# Patient Record
Sex: FEMALE | Race: WHITE | NOT HISPANIC OR LATINO | Employment: OTHER | URBAN - METROPOLITAN AREA
[De-identification: names, ages, dates, MRNs, and addresses within clinical notes are randomized per-mention and may not be internally consistent; named-entity substitution may affect disease eponyms.]

---

## 2018-02-02 ENCOUNTER — TELEPHONE (OUTPATIENT)
Dept: GASTROENTEROLOGY | Facility: CLINIC | Age: 68
End: 2018-02-02

## 2018-02-02 DIAGNOSIS — K58.0 IRRITABLE BOWEL SYNDROME WITH DIARRHEA: Primary | ICD-10-CM

## 2018-02-02 RX ORDER — ALOSETRON HYDROCHLORIDE 0.5 MG/1
1 TABLET, FILM COATED ORAL DAILY
COMMUNITY
Start: 2016-06-21 | End: 2018-02-02 | Stop reason: SDUPTHER

## 2018-02-02 RX ORDER — ALOSETRON HYDROCHLORIDE 0.5 MG/1
0.5 TABLET, FILM COATED ORAL DAILY
Qty: 90 TABLET | Refills: 3 | Status: SHIPPED | OUTPATIENT
Start: 2018-02-02 | End: 2018-05-17 | Stop reason: SDUPTHER

## 2018-05-17 DIAGNOSIS — K58.0 IRRITABLE BOWEL SYNDROME WITH DIARRHEA: ICD-10-CM

## 2018-05-17 RX ORDER — ALOSETRON HYDROCHLORIDE 0.5 MG/1
0.5 TABLET, FILM COATED ORAL DAILY
Qty: 30 TABLET | Refills: 0 | Status: SHIPPED | OUTPATIENT
Start: 2018-05-17 | End: 2018-05-31 | Stop reason: DRUGHIGH

## 2018-05-17 NOTE — TELEPHONE ENCOUNTER
Spoke to Shala Republic T  Preformed a verbal authorization for medication which was approved should be sending over within next 2-3 hours        Patient's Medication Medicare Number  [id number]      Approval number:  [id number]        Pt's Medicare Part D ID Number:  [id number]

## 2018-05-29 RX ORDER — CITALOPRAM 40 MG/1
40 TABLET ORAL DAILY
Refills: 3 | COMMUNITY
Start: 2018-02-23

## 2018-05-29 RX ORDER — EZETIMIBE 10 MG/1
10 TABLET ORAL DAILY
Refills: 3 | COMMUNITY
Start: 2018-03-16

## 2018-05-29 RX ORDER — MELOXICAM 15 MG/1
TABLET ORAL
Refills: 0 | COMMUNITY
Start: 2018-04-03

## 2018-05-29 RX ORDER — AMLODIPINE BESYLATE 5 MG/1
TABLET ORAL
COMMUNITY

## 2018-05-29 RX ORDER — PANTOPRAZOLE SODIUM 40 MG/1
1 TABLET, DELAYED RELEASE ORAL DAILY
COMMUNITY
Start: 2016-07-19 | End: 2018-05-31 | Stop reason: DRUGHIGH

## 2018-05-29 RX ORDER — ROSUVASTATIN CALCIUM 5 MG/1
TABLET, COATED ORAL
COMMUNITY

## 2018-05-29 RX ORDER — HYDROQUINONE 40 MG/G
CREAM TOPICAL
Refills: 0 | COMMUNITY
Start: 2018-02-26

## 2018-05-29 RX ORDER — LEVOTHYROXINE SODIUM 112 UG/1
112 TABLET ORAL DAILY
Refills: 3 | COMMUNITY
Start: 2018-03-16

## 2018-05-31 ENCOUNTER — OFFICE VISIT (OUTPATIENT)
Dept: GASTROENTEROLOGY | Facility: CLINIC | Age: 68
End: 2018-05-31
Payer: MEDICARE

## 2018-05-31 VITALS
SYSTOLIC BLOOD PRESSURE: 152 MMHG | HEART RATE: 73 BPM | BODY MASS INDEX: 38.64 KG/M2 | DIASTOLIC BLOOD PRESSURE: 80 MMHG | WEIGHT: 225.13 LBS

## 2018-05-31 DIAGNOSIS — Z12.11 SCREENING FOR COLON CANCER: Primary | ICD-10-CM

## 2018-05-31 DIAGNOSIS — K58.0 IRRITABLE BOWEL SYNDROME WITH DIARRHEA: ICD-10-CM

## 2018-05-31 DIAGNOSIS — K21.9 GASTROESOPHAGEAL REFLUX DISEASE, ESOPHAGITIS PRESENCE NOT SPECIFIED: ICD-10-CM

## 2018-05-31 PROCEDURE — 99214 OFFICE O/P EST MOD 30 MIN: CPT | Performed by: PHYSICIAN ASSISTANT

## 2018-05-31 RX ORDER — PANTOPRAZOLE SODIUM 40 MG/1
40 TABLET, DELAYED RELEASE ORAL 2 TIMES DAILY
Qty: 60 TABLET | Refills: 1 | Status: SHIPPED | OUTPATIENT
Start: 2018-05-31 | End: 2018-06-16 | Stop reason: SDUPTHER

## 2018-05-31 RX ORDER — ALOSETRON HYDROCHLORIDE 0.5 MG/1
0.5 TABLET, FILM COATED ORAL 2 TIMES DAILY
Qty: 60 TABLET | Refills: 1 | Status: SHIPPED | OUTPATIENT
Start: 2018-05-31 | End: 2018-08-16 | Stop reason: SDUPTHER

## 2018-05-31 RX ORDER — MELATONIN
6000 DAILY
COMMUNITY

## 2018-05-31 NOTE — LETTER
May 31, 2018     Naheed Wright MD  [id number] [facility] [address]    Patient: Juan Lopez   YOB: 1950   Date of Visit: 5/31/2018       Dear Dr Jim Fox:    Thank you for referring Juan Lopez to me for evaluation  Below are my notes for this consultation  If you have questions, please do not hesitate to call me  I look forward to following your patient along with you  Sincerely,        Bianca Vicente PA-C        CC: No Recipients  Vernida Sides  5/31/2018 11:35 AM  Sign at close encounter  [facility] - Outpatient Follow-up Note  Juan Lopez 70 y o  female MRN: [id number]  Encounter: [id number]          ASSESSMENT AND PLAN:      1  Screening for colon cancer  - Last colonoscopy in 2013 without polyps but with significant family history of colon cancer in her father  - Schedule for colonoscopy as screening    2  Gastroesophageal reflux disease, esophagitis presence not specified  - Increase protonix to 40 mg BID  - Schedule for EGD to screen for Martines's, esophagitis, gastritis  - Her anxiety may also be playing a component which she is aware of    3  Irritable bowel syndrome with diarrhea  - Will increase lotronex to 0 5 mg BID to see if this helps her bowel movements further  - Discussed risks of ischemic colitis and watching for symptoms such as severe abdominal pain or BRBPR; she is aware and understands      Follow up pending EGD/colonoscopy results  ______________________________________________________________________    SUBJECTIVEEvelena Apley is a pleasant 69 yo F with a PMH of HTN, IBS diarrhea on lotronex, depression/anxiety, hypothyroidism, HLD, GERD, presenting for follow up of her chronic GI issues including need for colon cancer screening  Her last colonoscopy was in 2013 without polyps but she does have a family history of colon cancer in her father so she is due this year for recall    Her bowel movements are worse due to anxiety as she is under a lot of stress traveling back and forth to [place] helping her mother who is very demented  She has urgency in the morning and doesn't plan for anything in the AM due to her bowel habits  The lotronex does help but not as much as it used to  She also had chronic GERD but has never had an EGD  She takes protonix daily  She is having increased upper GI gas but is unable to belch  She denies nausea or vomiting  She denies dysphagia  Her brother has Martines's so she feels like she needs to have an EGD as well  She denies unintentional weight loss, melena, or hematochezia  REVIEW OF SYSTEMS IS OTHERWISE NEGATIVE        Historical Information   Past Medical History:   Diagnosis Date    Asthma     Hyperlipidemia     Hypertension     Hypothyroidism      Past Surgical History:   Procedure Laterality Date    BLADDER SURGERY      LIFT    BREAST SURGERY Left     CHOLECYSTECTOMY       Social History   History   Alcohol Use    Yes     Comment: 1-2 times a year     History   Drug Use No     History   Smoking Status    Former Smoker    Years: 40 00   Smokeless Tobacco    Never Used     Family History   Problem Relation Age of Onset    Breast cancer Mother     Dementia Mother     Colon cancer Father     Heart disease Brother        Meds/Allergies       Current Outpatient Prescriptions:     amLODIPine (NORVASC) 5 mg tablet    aspirin 81 MG tablet    cholecalciferol (VITAMIN D3) 1,000 units tablet    citalopram (CeleXA) 40 mg tablet    ezetimibe (ZETIA) 10 mg tablet    hydroquinone 4 % cream    levothyroxine 112 mcg tablet    Probiotic Product ([employer] PROBIOTIC PO)    RESTASIS MULTIDOSE 0 05 % ophthalmic emulsion    rosuvastatin (CRESTOR) 5 mg tablet    alosetron (LOTRONEX) 0 5 MG tablet    meloxicam (MOBIC) 15 mg tablet    pantoprazole (PROTONIX) 40 mg tablet    Allergies   Allergen Reactions    Ciprofloxacin     Morphine Vomiting    Other            Objective Blood pressure 152/80, pulse 73, weight 102 kg (225 lb 2 oz)  Body mass index is 38 64 kg/m²  PHYSICAL EXAM:      General Appearance:   Alert, cooperative, no distress   HEENT:   Normocephalic, atraumatic, anicteric      Neck:  Supple, symmetrical, trachea midline   Lungs:   Clear to auscultation bilaterally; no rales, rhonchi or wheezing; respirations unlabored    Heart[de-identified]   Regular rate and rhythm; no murmur, rub, or gallop  Abdomen:   Soft, non-tender, non-distended; normal bowel sounds; no masses, no organomegaly    Rectal:   Deferred    Extremities:  No cyanosis, clubbing or edema    Pulses:  2+ and symmetric    Skin:  No jaundice, rashes, or lesions      Lab Results:   No visits with results within 1 Day(s) from this visit  Latest known visit with results is:   No results found for any previous visit  Radiology Results:   No results found

## 2018-05-31 NOTE — PROGRESS NOTES
[facility] - Outpatient Follow-up Note  Juan Lopez 70 y o  female MRN: [id number]  Encounter: [id number]          ASSESSMENT AND PLAN:      1  Screening for colon cancer  - Last colonoscopy in 2013 without polyps but with significant family history of colon cancer in her father  - Schedule for colonoscopy as screening    2  Gastroesophageal reflux disease, esophagitis presence not specified  - Increase protonix to 40 mg BID  - Schedule for EGD to screen for Martines's, esophagitis, gastritis  - Her anxiety may also be playing a component which she is aware of    3  Irritable bowel syndrome with diarrhea  - Will increase lotronex to 0 5 mg BID to see if this helps her bowel movements further  - Discussed risks of ischemic colitis and watching for symptoms such as severe abdominal pain or BRBPR; she is aware and understands      Follow up pending EGD/colonoscopy results  ______________________________________________________________________    SUBJECTIVEEvelena Apley is a pleasant 71 yo F with a PMH of HTN, IBS diarrhea on lotronex, depression/anxiety, hypothyroidism, HLD, GERD, presenting for follow up of her chronic GI issues including need for colon cancer screening  Her last colonoscopy was in 2013 without polyps but she does have a family history of colon cancer in her father so she is due this year for recall  Her bowel movements are worse due to anxiety as she is under a lot of stress traveling back and forth to [place] helping her mother who is very demented  She has urgency in the morning and doesn't plan for anything in the AM due to her bowel habits  The lotronex does help but not as much as it used to  She also had chronic GERD but has never had an EGD  She takes protonix daily  She is having increased upper GI gas but is unable to belch  She denies nausea or vomiting  She denies dysphagia  Her brother has Martines's so she feels like she needs to have an EGD as well   She denies unintentional weight loss, melena, or hematochezia  REVIEW OF SYSTEMS IS OTHERWISE NEGATIVE  Historical Information   Past Medical History:   Diagnosis Date    Asthma     Hyperlipidemia     Hypertension     Hypothyroidism      Past Surgical History:   Procedure Laterality Date    BLADDER SURGERY      LIFT    BREAST SURGERY Left     CHOLECYSTECTOMY       Social History   History   Alcohol Use    Yes     Comment: 1-2 times a year     History   Drug Use No     History   Smoking Status    Former Smoker    Years: 40 00   Smokeless Tobacco    Never Used     Family History   Problem Relation Age of Onset    Breast cancer Mother     Dementia Mother     Colon cancer Father     Heart disease Brother        Meds/Allergies       Current Outpatient Prescriptions:     amLODIPine (NORVASC) 5 mg tablet    aspirin 81 MG tablet    cholecalciferol (VITAMIN D3) 1,000 units tablet    citalopram (CeleXA) 40 mg tablet    ezetimibe (ZETIA) 10 mg tablet    hydroquinone 4 % cream    levothyroxine 112 mcg tablet    Probiotic Product ([employer] PROBIOTIC PO)    RESTASIS MULTIDOSE 0 05 % ophthalmic emulsion    rosuvastatin (CRESTOR) 5 mg tablet    alosetron (LOTRONEX) 0 5 MG tablet    meloxicam (MOBIC) 15 mg tablet    pantoprazole (PROTONIX) 40 mg tablet    Allergies   Allergen Reactions    Ciprofloxacin     Morphine Vomiting    Other            Objective     Blood pressure 152/80, pulse 73, weight 102 kg (225 lb 2 oz)  Body mass index is 38 64 kg/m²  PHYSICAL EXAM:      General Appearance:   Alert, cooperative, no distress   HEENT:   Normocephalic, atraumatic, anicteric      Neck:  Supple, symmetrical, trachea midline   Lungs:   Clear to auscultation bilaterally; no rales, rhonchi or wheezing; respirations unlabored    Heart[de-identified]   Regular rate and rhythm; no murmur, rub, or gallop     Abdomen:   Soft, non-tender, non-distended; normal bowel sounds; no masses, no organomegaly    Rectal:   Deferred  Extremities:  No cyanosis, clubbing or edema    Pulses:  2+ and symmetric    Skin:  No jaundice, rashes, or lesions      Lab Results:   No visits with results within 1 Day(s) from this visit  Latest known visit with results is:   No results found for any previous visit  Radiology Results:   No results found

## 2018-06-06 ENCOUNTER — TELEPHONE (OUTPATIENT)
Dept: GASTROENTEROLOGY | Facility: CLINIC | Age: 68
End: 2018-06-06

## 2018-06-06 NOTE — TELEPHONE ENCOUNTER
Submitted PA for Pantoprazole BID  It was approved for a year, June 6, 2019  PA #: [id number]    Called pharmacy and advised  LMOM pt advising it was approved

## 2018-06-16 DIAGNOSIS — K21.9 GASTROESOPHAGEAL REFLUX DISEASE, ESOPHAGITIS PRESENCE NOT SPECIFIED: ICD-10-CM

## 2018-06-18 RX ORDER — PANTOPRAZOLE SODIUM 40 MG/1
TABLET, DELAYED RELEASE ORAL
Qty: 30 TABLET | Refills: 6 | Status: SHIPPED | OUTPATIENT
Start: 2018-06-18 | End: 2018-11-14 | Stop reason: SDUPTHER

## 2018-07-17 ENCOUNTER — TELEPHONE (OUTPATIENT)
Dept: GASTROENTEROLOGY | Facility: CLINIC | Age: 68
End: 2018-07-17

## 2018-08-16 DIAGNOSIS — K58.0 IRRITABLE BOWEL SYNDROME WITH DIARRHEA: ICD-10-CM

## 2018-08-16 RX ORDER — ALOSETRON HYDROCHLORIDE 0.5 MG/1
0.5 TABLET, FILM COATED ORAL 2 TIMES DAILY
Qty: 60 TABLET | Refills: 1 | Status: SHIPPED | OUTPATIENT
Start: 2018-08-16 | End: 2018-11-14 | Stop reason: SDUPTHER

## 2018-11-14 DIAGNOSIS — K21.9 GASTROESOPHAGEAL REFLUX DISEASE, ESOPHAGITIS PRESENCE NOT SPECIFIED: ICD-10-CM

## 2018-11-14 DIAGNOSIS — K58.0 IRRITABLE BOWEL SYNDROME WITH DIARRHEA: ICD-10-CM

## 2018-11-14 RX ORDER — PANTOPRAZOLE SODIUM 40 MG/1
40 TABLET, DELAYED RELEASE ORAL DAILY
Qty: 30 TABLET | Refills: 2 | Status: SHIPPED | OUTPATIENT
Start: 2018-11-14 | End: 2019-08-23 | Stop reason: SDUPTHER

## 2018-11-14 RX ORDER — ALOSETRON HYDROCHLORIDE 0.5 MG/1
0.5 TABLET, FILM COATED ORAL 2 TIMES DAILY
Qty: 60 TABLET | Refills: 2 | Status: SHIPPED | OUTPATIENT
Start: 2018-11-14 | End: 2019-02-27 | Stop reason: SDUPTHER

## 2018-11-14 NOTE — TELEPHONE ENCOUNTER
Spoke to pt, advised we never received anything but that I would send RX for both Pantoprazole and Alosetron  RX sent to [facility] for Pantoprazole and Alosetron, 30 day supplys with refills

## 2018-11-14 NOTE — TELEPHONE ENCOUNTER
Patient called to find out why the refill for lotronex was denied and also would like a refill of pantoprazole   Please call patient [phone] ty

## 2019-01-31 ENCOUNTER — DOCUMENTATION (OUTPATIENT)
Dept: GASTROENTEROLOGY | Facility: CLINIC | Age: 69
End: 2019-01-31

## 2019-02-27 DIAGNOSIS — K58.0 IRRITABLE BOWEL SYNDROME WITH DIARRHEA: ICD-10-CM

## 2019-02-27 RX ORDER — ALOSETRON HYDROCHLORIDE 0.5 MG/1
0.5 TABLET, FILM COATED ORAL 2 TIMES DAILY
Qty: 60 TABLET | Refills: 2 | Status: SHIPPED | OUTPATIENT
Start: 2019-02-27

## 2019-05-28 ENCOUNTER — APPOINTMENT (EMERGENCY)
Dept: CT IMAGING | Facility: HOSPITAL | Age: 69
End: 2019-05-28
Payer: MEDICARE

## 2019-05-28 ENCOUNTER — HOSPITAL ENCOUNTER (EMERGENCY)
Facility: HOSPITAL | Age: 69
Discharge: HOME | End: 2019-05-28
Attending: EMERGENCY MEDICINE | Admitting: EMERGENCY MEDICINE
Payer: MEDICARE

## 2019-05-28 VITALS
OXYGEN SATURATION: 96 % | TEMPERATURE: 98.1 F | HEART RATE: 66 BPM | DIASTOLIC BLOOD PRESSURE: 72 MMHG | SYSTOLIC BLOOD PRESSURE: 161 MMHG | RESPIRATION RATE: 20 BRPM

## 2019-05-28 DIAGNOSIS — S02.401A CLOSED FRACTURE OF MAXILLARY SINUS, INITIAL ENCOUNTER (HCC): ICD-10-CM

## 2019-05-28 DIAGNOSIS — S02.2XXA CLOSED DISPLACED FRACTURE OF NASAL BONE, INITIAL ENCOUNTER: ICD-10-CM
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
## 2019-05-28 DIAGNOSIS — R04.0 EPISTAXIS: ICD-10-CM

## 2019-05-28 PROCEDURE — 99284 EMERGENCY DEPT VISIT MOD MDM: CPT

## 2019-05-28 PROCEDURE — 99284 EMERGENCY DEPT VISIT MOD MDM: CPT | Performed by: EMERGENCY MEDICINE

## 2019-05-28 PROCEDURE — 70486 CT MAXILLOFACIAL W/O DYE: CPT

## 2019-05-28 PROCEDURE — 70450 CT HEAD/BRAIN W/O DYE: CPT

## 2019-05-28 PROCEDURE — 30901 CONTROL OF NOSEBLEED: CPT | Performed by: EMERGENCY MEDICINE

## 2019-05-28 RX ORDER — AMOXICILLIN 500 MG/1
500 CAPSULE ORAL 3 TIMES DAILY
Qty: 21 CAPSULE | Refills: 0 | Status: SHIPPED | OUTPATIENT
Start: 2019-05-28 | End: 2019-06-04

## 2019-05-28 RX ORDER — ONDANSETRON 4 MG/1
4 TABLET, ORALLY DISINTEGRATING ORAL ONCE
Status: COMPLETED | OUTPATIENT
Start: 2019-05-28 | End: 2019-05-28

## 2019-05-28 RX ORDER — OXYMETAZOLINE HYDROCHLORIDE 0.05 G/100ML
2 SPRAY NASAL ONCE
Status: COMPLETED | OUTPATIENT
Start: 2019-05-28 | End: 2019-05-28

## 2019-05-28 RX ORDER — HYDROCODONE BITARTRATE AND ACETAMINOPHEN 5; 325 MG/1; MG/1
1 TABLET ORAL ONCE
Status: COMPLETED | OUTPATIENT
Start: 2019-05-28 | End: 2019-05-28

## 2019-05-28 RX ORDER — FLUTICASONE PROPIONATE 50 MCG
1 SPRAY, SUSPENSION (ML) NASAL DAILY
Qty: 16 G | Refills: 0 | Status: SHIPPED | OUTPATIENT
Start: 2019-05-28 | End: 2019-06-04

## 2019-05-28 RX ORDER — HYDROCODONE BITARTRATE AND ACETAMINOPHEN 5; 325 MG/1; MG/1
1 TABLET ORAL EVERY 4 HOURS PRN
Qty: 20 TABLET | Refills: 0 | Status: SHIPPED | OUTPATIENT
Start: 2019-05-28 | End: 2019-06-05

## 2019-05-28 RX ADMIN — HYDROCODONE BITARTRATE AND ACETAMINOPHEN 1 TABLET: 5; 325 TABLET ORAL at 20:16

## 2019-05-28 RX ADMIN — ONDANSETRON 4 MG: 4 TABLET, ORALLY DISINTEGRATING ORAL at 20:17

## 2019-05-28 RX ADMIN — OXYMETAZOLINE HYDROCHLORIDE 2 SPRAY: 0.05 SPRAY NASAL at 18:33

## 2019-06-14 ENCOUNTER — TELEPHONE (OUTPATIENT)
Dept: GASTROENTEROLOGY | Facility: CLINIC | Age: 69
End: 2019-06-14

## 2019-08-23 DIAGNOSIS — K21.9 GASTROESOPHAGEAL REFLUX DISEASE, ESOPHAGITIS PRESENCE NOT SPECIFIED: ICD-10-CM

## 2019-08-23 RX ORDER — PANTOPRAZOLE SODIUM 40 MG/1
TABLET, DELAYED RELEASE ORAL
Qty: 30 TABLET | Refills: 2 | Status: SHIPPED | OUTPATIENT
Start: 2019-08-23

## 2020-10-23 ENCOUNTER — TELEPHONE (OUTPATIENT)
Dept: GASTROENTEROLOGY | Facility: CLINIC | Age: 70
End: 2020-10-23

## 2021-11-02 RX ORDER — ALPRAZOLAM 0.5 MG/1
0.5 TABLET ORAL 3 TIMES DAILY PRN
COMMUNITY
Start: 2021-10-19 | End: 2022-04-17

## 2021-11-02 RX ORDER — KETOCONAZOLE 20 MG/G
CREAM TOPICAL
COMMUNITY

## 2021-11-02 RX ORDER — LEVOTHYROXINE SODIUM 0.12 MG/1
125 TABLET ORAL DAILY
COMMUNITY
Start: 2021-10-19 | End: 2022-10-19

## 2021-11-02 RX ORDER — HYDROCODONE BITARTRATE AND ACETAMINOPHEN 5; 325 MG/1; MG/1
TABLET ORAL
COMMUNITY

## 2021-11-02 RX ORDER — AMOXICILLIN 500 MG/1
CAPSULE ORAL
COMMUNITY

## 2021-11-02 RX ORDER — NYSTATIN AND TRIAMCINOLONE ACETONIDE 100000; 1 [USP'U]/G; MG/G
OINTMENT TOPICAL
COMMUNITY

## 2021-11-02 RX ORDER — LOTEPREDNOL ETABONATE 5 MG/G
OINTMENT OPHTHALMIC
COMMUNITY

## 2021-11-02 RX ORDER — TOBRAMYCIN 3 MG/ML
SOLUTION/ DROPS OPHTHALMIC
COMMUNITY

## 2021-11-02 RX ORDER — ALBUTEROL SULFATE 90 UG/1
AEROSOL, METERED RESPIRATORY (INHALATION)
COMMUNITY

## 2021-11-02 RX ORDER — TRIAMCINOLONE ACETONIDE 1 MG/G
CREAM TOPICAL
COMMUNITY

## 2021-11-02 RX ORDER — IBUPROFEN 800 MG/1
TABLET ORAL
COMMUNITY

## 2021-11-02 RX ORDER — TACROLIMUS 1 MG/G
OINTMENT TOPICAL
COMMUNITY
Start: 2021-08-10

## 2021-11-02 RX ORDER — DOXYCYCLINE HYCLATE 100 MG
TABLET ORAL
COMMUNITY

## 2021-11-02 RX ORDER — SULFAMETHOXAZOLE AND TRIMETHOPRIM 800; 160 MG/1; MG/1
TABLET ORAL
COMMUNITY

## 2021-11-02 RX ORDER — DIFLUPREDNATE 0.5 MG/ML
EMULSION OPHTHALMIC
COMMUNITY

## 2021-11-03 ENCOUNTER — OFFICE VISIT (OUTPATIENT)
Dept: GASTROENTEROLOGY | Facility: CLINIC | Age: 71
End: 2021-11-03
Payer: MEDICARE

## 2021-11-03 VITALS
SYSTOLIC BLOOD PRESSURE: 140 MMHG | WEIGHT: 214.6 LBS | HEIGHT: 63 IN | DIASTOLIC BLOOD PRESSURE: 80 MMHG | BODY MASS INDEX: 38.02 KG/M2 | HEART RATE: 80 BPM

## 2021-11-03 DIAGNOSIS — Z80.0 FAMILY HISTORY OF COLON CANCER: ICD-10-CM

## 2021-11-03 DIAGNOSIS — Z12.11 SCREENING FOR MALIGNANT NEOPLASM OF COLON: ICD-10-CM

## 2021-11-03 DIAGNOSIS — K59.1 FUNCTIONAL DIARRHEA: Primary | ICD-10-CM

## 2021-11-03 PROCEDURE — 99214 OFFICE O/P EST MOD 30 MIN: CPT | Performed by: PHYSICIAN ASSISTANT

## 2021-11-03 RX ORDER — ACETAMINOPHEN AND CODEINE PHOSPHATE 300; 30 MG/1; MG/1
TABLET ORAL
COMMUNITY

## 2021-11-03 RX ORDER — MONTELUKAST SODIUM 4 MG/1
1 TABLET, CHEWABLE ORAL 2 TIMES DAILY
Qty: 28 TABLET | Refills: 0 | Status: SHIPPED | OUTPATIENT
Start: 2021-11-03 | End: 2021-11-17

## 2021-11-16 ENCOUNTER — TELEPHONE (OUTPATIENT)
Dept: GASTROENTEROLOGY | Facility: CLINIC | Age: 71
End: 2021-11-16

## 2022-01-03 ENCOUNTER — TELEPHONE (OUTPATIENT)
Dept: GASTROENTEROLOGY | Facility: CLINIC | Age: 72
End: 2022-01-03

## 2022-01-03 NOTE — TELEPHONE ENCOUNTER
Patient called guillermo not have a ride for colon/EGD will need to schedule for February, when daughter gets her work schedule  Camelia will call you

## 2023-01-04 ENCOUNTER — TELEPHONE (OUTPATIENT)
Dept: OBGYN CLINIC | Facility: HOSPITAL | Age: 73
End: 2023-01-04

## 2023-01-05 NOTE — TELEPHONE ENCOUNTER
I lvm to schedule from ortho care request    Where Does it Hurt? Right muscle above the butt  Are you considering joint replacement? No   Are you seeking a second opinion? No  If yes, who is your doctor?

## 2023-01-23 ENCOUNTER — OFFICE VISIT (OUTPATIENT)
Dept: OBGYN CLINIC | Facility: CLINIC | Age: 73
End: 2023-01-23

## 2023-01-23 VITALS
BODY MASS INDEX: 38.62 KG/M2 | HEIGHT: 63 IN | WEIGHT: 218 LBS | HEART RATE: 56 BPM | SYSTOLIC BLOOD PRESSURE: 157 MMHG | DIASTOLIC BLOOD PRESSURE: 75 MMHG

## 2023-01-23 DIAGNOSIS — M51.36 DEGENERATIVE DISC DISEASE, LUMBAR: Primary | ICD-10-CM

## 2023-01-23 DIAGNOSIS — M53.3 CHRONIC RIGHT SI JOINT PAIN: ICD-10-CM

## 2023-01-23 DIAGNOSIS — G89.29 CHRONIC RIGHT SI JOINT PAIN: ICD-10-CM

## 2023-01-23 DIAGNOSIS — S39.012A LUMBAR STRAIN, INITIAL ENCOUNTER: ICD-10-CM

## 2023-01-23 DIAGNOSIS — S76.011A STRAIN OF GLUTEUS MEDIUS, RIGHT, INITIAL ENCOUNTER: ICD-10-CM

## 2023-01-23 DIAGNOSIS — R29.898 WEAKNESS OF BOTH HIPS: ICD-10-CM

## 2023-01-23 DIAGNOSIS — R19.8 ABDOMINAL WEAKNESS: ICD-10-CM

## 2023-01-23 DIAGNOSIS — M47.816 LUMBAR FACET ARTHROPATHY: ICD-10-CM

## 2023-01-23 RX ORDER — DICLOFENAC SODIUM 75 MG/1
75 TABLET, DELAYED RELEASE ORAL 2 TIMES DAILY
Qty: 60 TABLET | Refills: 1 | Status: SHIPPED | OUTPATIENT
Start: 2023-01-23

## 2023-01-23 NOTE — PROGRESS NOTES
Assessment/Plan:  Assessment/Plan   Diagnoses and all orders for this visit:    Degenerative disc disease, lumbar  -     Ambulatory Referral to Physical Therapy; Future    Lumbar facet arthropathy  -     diclofenac (VOLTAREN) 75 mg EC tablet; Take 1 tablet (75 mg total) by mouth 2 (two) times a day  -     Ambulatory Referral to Physical Therapy; Future    Chronic right SI joint pain  -     Ambulatory Referral to Physical Therapy; Future  -     Ambulatory Referral to Pain Management; Future    Weakness of both hips  -     Ambulatory Referral to Physical Therapy; Future    Abdominal weakness  -     Ambulatory Referral to Physical Therapy; Future    Lumbar strain, initial encounter  -     Ambulatory Referral to Physical Therapy; Future    Strain of gluteus medius, right, initial encounter  -     Ambulatory Referral to Physical Therapy; Future          30-year-old female with right low back pain of more than few years duration  Discussed with patient physical exam, prior imaging studies, impression and plan  X-rays noted for bilateral SI joint degenerative changes and lower lumbar degenerative changes  Physical exam low back noted for mild midline tenderness L4-S1 and moderate right SI joint tenderness  She has mild tenderness at the gluteus medius on the right  There is no groin pain with NORMA and FADDIR of the hips, however reproduction of right SI joint pain with passive internal rotation of the hip  She has weakness both hips with abduction and hamstring tightness both lower extremities  Clinical  Impression is that she is symptomatic from combination of degenerative changes and SI joint pain  I discussed treatment regimen of anti-inflammatory, supplements, and formal therapy  She is to take diclofenac 75 mg twice daily with food for 30 days and not to take any ibuprofen or Aleve, but may take Tylenol    She is to start taking tumeric at least 1000 mg daily, tart cherry at least 1000 mg daily, and glucosamine -chondroitin 2 to 3 times a day  She is to start physical therapy as soon as possible and do home exercises as directed  I will also refer to pain management as SI joint injection may be warranted  She will follow up with me as needed  Subjective:   Patient ID: Jesse Cochran is a 67 y o  female  Chief Complaint   Patient presents with   • Lower Back - Pain       74-year-old female presents evaluation of right low back pain of more than few years duration  She denies any particular trauma or inciting event  Pain described as gradual in onset, localized to the low back on the right, radiating to the right hip and buttock and to the right groin, worse with bearing weight and ambulating, associated with stiffness in the back, and improved with resting  She states that when she is sitting she has little to no pain  He manages symptoms with taking Tylenol and ibuprofen  She reports having been seen by [facility] and had x-rays done which were noted for degenerative changes  She was given option to consider formal therapy or chiropractic  She elected for chiropractic  She reports temporary improvement symptoms following chiropractic adjustments  She  Ambulates with a walking cane due to symptoms  Back Pain  This is a chronic problem  The current episode started more than 1 year ago  The problem occurs daily  The problem has been gradually worsening  Associated symptoms include arthralgias  Pertinent negatives include no abdominal pain, chest pain, chills, fever, joint swelling, numbness, rash, sore throat or weakness  The symptoms are aggravated by standing and walking  She has tried rest, NSAIDs and acetaminophen ( chiropractic) for the symptoms  The treatment provided mild relief             The following portions of the patient's history were reviewed and updated as appropriate: She  has a past medical history of Asthma, GERD (gastroesophageal reflux disease), Hyperlipidemia, Hypertension, Hypothyroidism, and IBS (irritable bowel syndrome)  She  has a past surgical history that includes Cholecystectomy; Bladder surgery; Breast surgery (Left); ADENOIDECTOMY; and Tonsillectomy  Her family history includes Breast cancer in her mother; Colon cancer in her father; Dementia in her mother; Heart disease in her brother  She  reports that she has quit smoking  Her smoking use included cigarettes  She has never used smokeless tobacco  She reports current alcohol use  She reports that she does not use drugs  She is allergic to adhesive  [medical tape], amoxicillin-pot clavulanate, ciprofloxacin, morphine, other, and phenazopyridine       Review of Systems   Constitutional: Negative for chills and fever  HENT: Negative for sore throat  Eyes: Negative for visual disturbance  Respiratory: Negative for shortness of breath  Cardiovascular: Negative for chest pain  Gastrointestinal: Negative for abdominal pain  Genitourinary: Negative for flank pain  Musculoskeletal: Positive for arthralgias and back pain  Negative for joint swelling  Skin: Negative for rash and wound  Neurological: Negative for weakness and numbness  Hematological: Does not bruise/bleed easily  Psychiatric/Behavioral: Negative for self-injury  Objective:  Vitals:    01/23/23 1253   BP: 157/75   Pulse: 56   Weight: 98 9 kg (218 lb)   Height: 5' 3" (1 6 m)     Right Ankle Exam     Muscle Strength   Dorsiflexion:  5/5  Plantar flexion:  5/5      Left Ankle Exam     Muscle Strength   Dorsiflexion:  5/5   Plantar flexion:  5/5       Right Hip Exam     Tenderness   Right hip tenderness location:  gluteus medius      Muscle Strength   Abduction: 4/5   Flexion: 5/5     Tests   NORMA: negative    Comments:  Negative FADDIR      Left Hip Exam     Muscle Strength   Abduction: 4/5   Flexion: 5/5     Tests   NORMA: negative    Comments:  Negative FADDIR      Back Exam     Tenderness   The patient is experiencing tenderness in the lumbar and sacroiliac  Range of Motion   Extension: normal   Flexion: normal   Lateral bend right: abnormal   Lateral bend left: abnormal   Rotation right: abnormal   Rotation left: abnormal     Muscle Strength   Right Quadriceps:  5/5   Left Quadriceps:  5/5     Tests   Straight leg raise right: negative  Straight leg raise left: negative    Other   Gait: antalgic           Strength/Myotome Testing     Left Ankle/Foot   Dorsiflexion: 5  Plantar flexion: 5    Right Ankle/Foot   Dorsiflexion: 5  Plantar flexion: 5      Physical Exam  Vitals and nursing note reviewed  Constitutional:       General: She is not in acute distress  Appearance: She is well-developed  She is not ill-appearing or diaphoretic  HENT:      Head: Normocephalic  Right Ear: External ear normal       Left Ear: External ear normal    Eyes:      Conjunctiva/sclera: Conjunctivae normal    Neck:      Trachea: No tracheal deviation  Cardiovascular:      Rate and Rhythm: Normal rate  Pulmonary:      Effort: Pulmonary effort is normal  No respiratory distress  Abdominal:      General: There is no distension  Musculoskeletal:         General: Tenderness present  No swelling, deformity or signs of injury  Lumbar back: Negative right straight leg raise test and negative left straight leg raise test    Skin:     General: Skin is warm and dry  Coloration: Skin is not jaundiced or pale  Neurological:      Mental Status: She is alert and oriented to person, place, and time  Psychiatric:         Mood and Affect: Mood normal          Behavior: Behavior normal          Thought Content:  Thought content normal          Judgment: Judgment normal

## 2023-02-07 ENCOUNTER — EVALUATION (OUTPATIENT)
Dept: PHYSICAL THERAPY | Facility: CLINIC | Age: 73
End: 2023-02-07

## 2023-02-07 DIAGNOSIS — S39.012D LUMBAR STRAIN, SUBSEQUENT ENCOUNTER: ICD-10-CM

## 2023-02-07 DIAGNOSIS — M53.3 CHRONIC RIGHT SI JOINT PAIN: ICD-10-CM

## 2023-02-07 DIAGNOSIS — G89.29 CHRONIC RIGHT SI JOINT PAIN: ICD-10-CM

## 2023-02-07 DIAGNOSIS — R19.8 ABDOMINAL WEAKNESS: ICD-10-CM

## 2023-02-07 DIAGNOSIS — R29.898 WEAKNESS OF BOTH HIPS: ICD-10-CM

## 2023-02-07 DIAGNOSIS — M51.36 DEGENERATIVE DISC DISEASE, LUMBAR: ICD-10-CM

## 2023-02-07 DIAGNOSIS — S76.012D STRAIN OF GLUTEUS MEDIUS OF LEFT LOWER EXTREMITY, SUBSEQUENT ENCOUNTER: ICD-10-CM

## 2023-02-07 DIAGNOSIS — M47.816 LUMBAR FACET ARTHROPATHY: ICD-10-CM

## 2023-02-07 NOTE — PROGRESS NOTES
Assessment:  1  Chronic right SI joint pain        Plan:  Orders Placed This Encounter   Procedures   • FL spine and pain procedure     Standing Status:   Future     Standing Expiration Date:   2/8/2027     Order Specific Question:   Reason for Exam:     Answer:   right SI joint injection     Order Specific Question:   Anticoagulant hold needed? Answer:   no       No orders of the defined types were placed in this encounter  My impressions and treatment recommendations were discussed in detail with the patient, who verbalized understanding and had no further questions  This is 58-year-old female presents for chief complaint of right-sided low back pain and right lateral hip pain  She has tenderness palpation of the right SI joint with pain with provocative maneuvers notable  Appears she is clinically symptomatic for right sacroiliitis  She has degenerative sacroiliitis on imaging as well  We will perform right SI joint injection the first, guidance of her symptoms  She is agreeable to this plan  She had mild positive facet loading towards the right differential diagnosis also includes facet mediated pain, however this did not produice a significant amount of symptom reproduction  [place] Prescription Drug Monitoring Program report was reviewed and was appropriate     Complete risks and benefits including bleeding, infection, tissue reaction, nerve injury and allergic reaction were discussed  The approach was demonstrated using models and literature was provided  Verbal and written consent was obtained  Discharge instructions were provided  I personally saw and examined the patient and I agree with the above discussed plan of care  History of Present Illness:    Mariya Elise is a 75 y o  female who presents to [facility] for initial evaluation of the above stated pain complaints   The patient has a past medical and chronic pain history as outlined in the assessment section  She was referred by [address] [place], [place] [id number] [facility],  [address]   Right-sided back and hip pain  Chronic issue for 4 years  Undetermined cause  Moderate to severe in intensity over the past month  5 out of 10   6-8 out of 10  Nearly constant  Worse in the afternoon  Described as throbbing, aching in nature  Subjective weakness in lower extremities  Pain is increased with standing, walking  No change with exercise  Decreased with relaxation and sitting  Moderate degenerative changes of bilateral SI joints  Also degenerative changes of the lower lumbar spine  X-ray report from May 2021 also reports hip joint spaces are maintained"  Also was includes anxiety, emphysema, GERD, high cholesterol, hypertension, thyroid disease has history of skin cancer  Moderate relief with heat/ice therapy and chiropractic manipulation  No tobacco, marijuana, alcohol, blood thinner use  In the past is used diclofenac, Lidoderm patch, Celebrex, meloxicam, Xanax, Soma, Flexeril, oral steroids, gabapentin  Currently using acetaminophen, ibuprofen, Celexa  Review of Systems:    Review of Systems   Constitutional: Negative for fever and unexpected weight change  HENT: Negative for trouble swallowing  Eyes: Negative for visual disturbance  Respiratory: Negative for shortness of breath and wheezing  Cardiovascular: Negative for chest pain and palpitations  Gastrointestinal: Negative for constipation, diarrhea, nausea and vomiting  Endocrine: Negative for cold intolerance, heat intolerance and polydipsia  Genitourinary: Negative for difficulty urinating and frequency  Musculoskeletal: Positive for arthralgias and myalgias  Negative for gait problem and joint swelling  Skin: Negative for rash  Neurological: Negative for dizziness, seizures, syncope, weakness and headaches  Hematological: Does not bruise/bleed easily  Psychiatric/Behavioral: Negative for dysphoric mood  Anxiety   All other systems reviewed and are negative            Past Medical History:   Diagnosis Date   • Arthritis    • Asthma    • [facility] ([facility] )     Skin cancer   • Emphysema of lung ([facility] ) 01/2020    Albuterol   • GERD (gastroesophageal reflux disease)    • Hyperlipidemia    • Hypertension    • Hypothyroidism    • IBS (irritable bowel syndrome)    • Osteoarthritis        Past Surgical History:   Procedure Laterality Date   • ADENOIDECTOMY     • BLADDER SURGERY      LIFT   • BREAST SURGERY Left    • CHOLECYSTECTOMY     • TONSILLECTOMY         Family History   Problem Relation Age of Onset   • Breast cancer Mother    • Dementia Mother    • Colon cancer Father    • Heart disease Brother        Social History     Occupational History   • Not on file   Tobacco Use   • Smoking status: Former     Packs/day: 1 00     Years: 15 00     Pack years: 15 00     Types: Cigarettes   • Smokeless tobacco: Never   Substance and Sexual Activity   • Alcohol use: Yes     Comment: 1-2 times a year   • Drug use: No   • Sexual activity: Not on file         Current Outpatient Medications:   •  albuterol (PROVENTIL HFA,VENTOLIN HFA) 90 mcg/act inhaler, albuterol sulfate HFA 90 mcg/actuation aerosol inhaler, Disp: , Rfl:   •  amLODIPine (NORVASC) 5 mg tablet, Take by mouth, Disp: , Rfl:   •  cholecalciferol (VITAMIN D3) 1,000 units tablet, Take 6,000 Units by mouth daily, Disp: , Rfl:   •  citalopram (CeleXA) 40 mg tablet, Take 40 mg by mouth daily, Disp: , Rfl: 3  •  diclofenac (VOLTAREN) 75 mg EC tablet, Take 1 tablet (75 mg total) by mouth 2 (two) times a day, Disp: 60 tablet, Rfl: 1  •  Diclofenac Sodium (VOLTAREN) 1 %, Voltaren 1 % topical gel  APPLY 2 GRAM TO THE AFFECTED AREA(S) BY TOPICAL ROUTE 4 TIMES PER DAY, Disp: , Rfl:   •  ezetimibe (ZETIA) 10 mg tablet, Take 10 mg by mouth daily, Disp: , Rfl: 3  •  levothyroxine 112 mcg tablet, Take 112 mcg by mouth daily, Disp: , Rfl: 3  •  Loteprednol Etabonate (Lotemax) 0 5 % OINT, Lotemax 0 5 % eye ointment, Disp: , Rfl:   •  mometasone (NASONEX) 50 mcg/act nasal spray, 2 sprays into each nostril daily, Disp: 17 g, Rfl: 5  •  pantoprazole (PROTONIX) 40 mg tablet, TAKE 1 TABLET BY MOUTH EVERY DAY, Disp: 30 tablet, Rfl: 2  •  Probiotic Product ([employer] PROBIOTIC PO), Take by mouth, Disp: , Rfl:   •  RESTASIS MULTIDOSE 0 05 % ophthalmic emulsion, APPLY 1 DROP INTO BOTH EYES EVERY TWELVE HOURS, Disp: , Rfl: 11  •  rosuvastatin (CRESTOR) 5 mg tablet, Take by mouth, Disp: , Rfl:   •  triamcinolone (KENALOG) 0 1 % cream, triamcinolone acetonide 0 1 % topical cream  ONCE DAILY TO BODY PSORIASIS FOR UP TO 3 WEEKS   DO NOT USE ON FACE OR GENITALS , Disp: , Rfl:   •  acetaminophen-codeine (TYLENOL with CODEINE #3) 300-30 MG per tablet, acetaminophen 300 mg-codeine 30 mg tablet (Patient not taking: No sig reported), Disp: , Rfl:   •  alosetron (LOTRONEX) 0 5 MG tablet, TAKE 1 TABLET (0 5 MG TOTAL) BY MOUTH 2 (TWO) TIMES A DAY (Patient not taking: Reported on 11/3/2021), Disp: 60 tablet, Rfl: 2  •  ALPRAZolam (XANAX) 0 5 mg tablet, Take 0 5 mg by mouth Three times daily as needed, Disp: , Rfl:   •  amoxicillin (AMOXIL) 500 mg capsule, amoxicillin 500 mg capsule (Patient not taking: No sig reported), Disp: , Rfl:   •  aspirin 81 MG tablet, Take by mouth (Patient not taking: Reported on 11/3/2021), Disp: , Rfl:   •  colestipol (COLESTID) 1 g tablet, Take 1 tablet (1 g total) by mouth 2 (two) times a day for 14 days, Disp: 28 tablet, Rfl: 0  •  Difluprednate 0 05 % EMUL, Durezol 0 05 % eye drops (Patient not taking: No sig reported), Disp: , Rfl:   •  doxycycline hyclate (VIBRA-TABS) 100 mg tablet, doxycycline hyclate 100 mg tablet (Patient not taking: No sig reported), Disp: , Rfl:   •  HYDROcodone-acetaminophen (NORCO) 5-325 mg per tablet, hydrocodone 5 mg-acetaminophen 325 mg tablet (Patient not taking: No sig reported), Disp: , Rfl:   •  hydroquinone 4 % cream, TWICE A DAY TO FACE SPOTS, Disp: , Rfl: 0  •  ibuprofen (MOTRIN) 800 mg tablet, ibuprofen 800 mg tablet, Disp: , Rfl:   •  ketoconazole (NIZORAL) 2 % cream, ketoconazole 2 % topical cream, Disp: , Rfl:   •  levothyroxine 125 mcg tablet, Take 125 mcg by mouth daily, Disp: , Rfl:   •  meloxicam (MOBIC) 15 mg tablet, TAKE 1 TABLET BY MOUTH EVERY DAY WITH MEAL (Patient not taking: Reported on 11/3/2021), Disp: , Rfl: 0  •  nystatin-triamcinolone (MYCOLOG-II) ointment, nystatin-triamcinolone 100,000 unit/gram-0 1 % topical ointment, Disp: , Rfl:   •  sulfamethoxazole-trimethoprim (BACTRIM DS) 800-160 mg per tablet, sulfamethoxazole 800 mg-trimethoprim 160 mg tablet, Disp: , Rfl:   •  tacrolimus (PROTOPIC) 0 1 % ointment, , Disp: , Rfl:   •  tobramycin (TOBREX) 0 3 % SOLN, tobramycin 0 3 % eye drops, Disp: , Rfl:     Allergies   Allergen Reactions   • Adhesive  [Medical Tape]      blisters   • Amoxicillin-Pot Clavulanate Diarrhea and Other (See Comments)   • Ciprofloxacin    • Morphine Vomiting   • Other    • Phenazopyridine Other (See Comments)       Physical Exam:    /78 (BP Location: Left arm, Patient Position: Sitting, Cuff Size: Standard)   Pulse (!) 50   Ht 5' 3" (1 6 m)   BMI 38 62 kg/m²     Constitutional: normal, well developed, well nourished, alert, in no distress and non-toxic and no overt pain behavior  Eyes: anicteric  HEENT: grossly intact  Neck: supple, symmetric, trachea midline and no masses   Pulmonary:even and unlabored  Cardiovascular:No edema or pitting edema present  Skin:Normal without rashes or lesions and well hydrated  Psychiatric:Mood and affect appropriate  Neurologic:Cranial Nerves II-XII grossly intact  Musculoskeletal:ttp over the right greater trochanter      Lumbar Spine Exam    Appearance:  Normal lordosis  Palpation/Tenderness:  right lumbar paraspinal tenderness  right sacroiliac joint tenderness  Sensory:  no sensory deficits noted  Motor Strength:  Left hip flexion: 5/5  Left hip extension:  5/5  Right hip flexion:  5/5  Right hip extension:  5/5  Left knee flexion:  5/5  Left knee extension:  5/5  Right knee flexion:  5/5  Right knee extension:  5/5  Left foot dorsiflexion:  5/5  Left foot plantar flexion:  5/5  Right foot dorsiflexion:  5/5  Right foot plantar flexion:  5/5  Reflexes:  Left Patellar:  2+   Right Patellar:  2+   Left Achilles:  2+   Right Achilles:  2+   Special Tests:  Left Straight Leg Test:  negative  Right Straight Leg Test:  negative  Right Dar's Maneuver:  positive  Right Gaenslen's Test:  positive  Right Pelvic Distraction Test:  positive  Right Piriformis Stretch Test:  negative  Lumbar facet loading mildly positive on the right    Imaging        5/04/2021  L-spine -- Digital Radiography   Pelvis -- --     Impression    IMPRESSION:     No evidence of inflammatory arthropathy involving bilateral sacroiliac joints  Moderate osteoarthritis involving bilateral sacroiliac joints  Degenerative   changes of the lower lumbar spine  Workstation:[id number]  Narrative    INDICATION: Pain  Psoriasis  TECHNIQUE: 2 views of the sacroiliac joints  COMPARISON: None  FINDINGS:     Moderate degenerative changes of the bilateral sacroiliac joints  No erosions or   significant sclerosis  Degenerative changes of the lower lumbar spine  Bilateral   hip joint spaces are maintained  No acute fracture  Procedure Note    Patrica Lin MD - 05/04/2021   Formatting of this note might be different from the original    INDICATION: Pain  Psoriasis  TECHNIQUE: 2 views of the sacroiliac joints  COMPARISON: None  FINDINGS:     Moderate degenerative changes of the bilateral sacroiliac joints  No erosions or   significant sclerosis  Degenerative changes of the lower lumbar spine  Bilateral   hip joint spaces are maintained  No acute fracture       IMPRESSION:   IMPRESSION:     No evidence of inflammatory arthropathy involving bilateral sacroiliac joints  Moderate osteoarthritis involving bilateral sacroiliac joints  Degenerative   changes of the lower lumbar spine       FL spine and pain procedure    (Results Pending)       Orders Placed This Encounter   Procedures   • FL spine and pain procedure never used

## 2023-02-07 NOTE — PROGRESS NOTES
PT Evaluation     Today's date: 2023  Patient name: Torri Ramos  : 1950  MRN: [id number]  Referring provider: Sidney Anton  Dx:   Encounter Diagnosis     ICD-10-CM    1  Degenerative disc disease, lumbar  M51 36 Ambulatory Referral to Physical Therapy      2  Lumbar facet arthropathy  M47 816 Ambulatory Referral to Physical Therapy      3  Weakness of both hips  R29 898 Ambulatory Referral to Physical Therapy      4  Abdominal weakness  R19 8 Ambulatory Referral to Physical Therapy      5  Lumbar strain, subsequent encounter  S39 012D Ambulatory Referral to Physical Therapy      6  Chronic right SI joint pain  M53 3 Ambulatory Referral to Physical Therapy    G89 29       7  Strain of gluteus medius of left lower extremity, subsequent encounter  S76 012D Ambulatory Referral to Physical Therapy                     Assessment  Assessment details: Torri Ramos is a 75 y o  female who presents to PT with primary c/o R sided low back pain with radiating pain into R hip and groin that has been a chronic issue with no specific VONNIE  She presents today with decreased lumbar AROM, decreased R hip AROM, decreased gross RLE strength, decreased core/glute strength and endurance  Denies numbness/tingling in BLE and has relief with repeated lumbar flexion  Findings result in limited walking tolerance of about 1 block and use of walking stick, limited household task tolerance, decreased overall recreational capacity and quality of life  She would benefit from skilled PT to address these in order to restore function, mobility, safety, and quality of life  Pt educated on related anatomy, posture/positioning, symptom presentation, findings, POC and verbalizes understanding  HEP provided this date and pt verbalizes understanding  They leave this IE with all current questions answered to their satisfaction      Impairments: abnormal gait, abnormal or restricted ROM, activity intolerance, impaired physical strength, lacks appropriate home exercise program, pain with function, poor posture  and poor body mechanics  Barriers to therapy: chronicity of dx  Understanding of Dx/Px/POC: good  Goals  STG-in 4 weeks  1  Pain at worst 6/10  2  Pain-free lumbar AROM  3  Pt able to walk 2 blocks before symptom onset    LTG-by DC  1  Pain at worst 3/10  2  Pt able to grocery shop without increased low back pain  3  Pt able to walk dog without increased back pain  4  Independent with HEP  5  Increase FOTO to >/= expected    Plan  Patient would benefit from: skilled physical therapy  Planned modality interventions: biofeedback, cryotherapy, electrical stimulation/Russian stimulation, iontophoresis, unattended electrical stimulation, ultrasound, traction, thermotherapy: paraffin bath, thermotherapy: hydrocollator packs, TENS and low level laser therapy  Planned therapy interventions: aquatic therapy, balance, body mechanics training, coordination, flexibility, functional ROM exercises, gait training, graded exercise, home exercise program, therapeutic exercise, therapeutic activities, stretching, strengthening, postural training, patient education, neuromuscular re-education, manual therapy and joint mobilization  Frequency: 2x week  Duration in weeks: 8  Plan of Care beginning date: 2/7/2023  Plan of Care expiration date: 4/4/2023  Treatment plan discussed with: patient        Subjective Evaluation    History of Present Illness  Mechanism of injury: Leeanna Herrera is a 75 y o  female who presents to PT with primary c/o low back pain that is R sided and radiates into R hip and groin  This began at least 1 year ago with no specific VONNIE  Will be seeing pain mgmt tomorrow  Denies numbness/tingling  Feels she limps when she walks  Was seeing chiropractic for awhile with mild relief  Limited walking to about 1 block before pain sets in  Does walk the dog while using walking stick due to back pain    Pain  Current pain rating: 3  At best pain ratin  At worst pain rating: 10  Location: LBP  Quality: dull ache and radiating  Relieving factors: heat and change in position  Aggravating factors: walking and standing    Social Support  Lives in: multiple-level home  Lives with: alone    Treatments  Previous treatment: chiropractic and medication  Current treatment: physical therapy  Patient Goals  Patient goals for therapy: decreased pain, increased motion, increased strength, independence with ADLs/IADLs and return to sport/leisure activities  Patient goal: restore PLOF        Objective     Tenderness     Right Hip   Tenderness in the PSIS       Additional Tenderness Details  TTP R piriformis    Active Range of Motion     Lumbar   Flexion: 90 degrees   Extension: 20 degrees   Left lateral flexion: 15 degrees       Right lateral flexion: 25 degrees   Left Hip   External rotation (90/90): 30 degrees   Internal rotation (90/90): 30 degrees     Right Hip   External rotation (90/90): 30 degrees   Internal rotation (90/90): 30 degrees     Strength/Myotome Testing     Left Hip   Planes of Motion   Flexion: 4+  Extension: 4+  Abduction: 4+  Adduction: 4+    Right Hip   Planes of Motion   Flexion: 4  Extension: 4-  Abduction: 4+  Adduction: 4+    Additional Strength Details  Tested in seated position this date    General Comments:      Lumbar Comments  Decreased core/glute strength and endurance  Decreased postural endurance  Relief with repeated lumbar extension  LLE longer than R, somewhat corrected this date with MET, will re-assess             Precautions: bladder prolapse, GERD, IBS, hypothyroidism, HTN, hyperlipidemia, asthma      RE; 3/7  EPOC:              Manuals             SIJ MET? MS                                                   Neuro Re-Ed             TrA brace hep             + march                                                                              Ther Ex             rec bike for CV endurance and ROM             SLR flex HL hip add             HL hip abd             Lumbar rollout Hep 1 way            Row/LPD             Resisted side step             FSU/LSU             Mini squat                                                    Pt edu/HEP MS            Ther Activity                                       Gait Training                                       Modalities

## 2023-02-08 ENCOUNTER — CONSULT (OUTPATIENT)
Dept: PAIN MEDICINE | Facility: CLINIC | Age: 73
End: 2023-02-08

## 2023-02-08 VITALS
SYSTOLIC BLOOD PRESSURE: 151 MMHG | HEART RATE: 50 BPM | DIASTOLIC BLOOD PRESSURE: 78 MMHG | HEIGHT: 63 IN | BODY MASS INDEX: 38.62 KG/M2

## 2023-02-08 DIAGNOSIS — G89.29 CHRONIC RIGHT SI JOINT PAIN: ICD-10-CM

## 2023-02-08 DIAGNOSIS — M53.3 CHRONIC RIGHT SI JOINT PAIN: ICD-10-CM

## 2023-02-08 NOTE — PATIENT INSTRUCTIONS
Sacroiliitis   WHAT YOU NEED TO KNOW:   Sacroiliitis is a painful swelling of one or both of your sacroiliac joints that lasts at least 3 months  The sacroiliac joint connects your pelvis to the base of your spine  DISCHARGE INSTRUCTIONS:   Medicines:  Ask for more information about these and other medicines you may need to treat sacroiliitis:  Pain medicine: You may be given medicine to take away or decrease pain  Do not wait until the pain is severe before you take your medicine  You may be given the medicine as a pill to swallow or as a lotion that you put on the painful area  NSAIDs  help decrease swelling and pain or fever  This medicine is available with or without a doctor's order  NSAIDs can cause stomach bleeding or kidney problems in certain people  If you take blood thinner medicine, always ask your healthcare provider if NSAIDs are safe for you  Always read the medicine label and follow directions  Muscle relaxers  help decrease pain and muscle spasms  Take your medicine as directed  Contact your healthcare provider if you think your medicine is not helping or if you have side effects  Tell him of her if you are allergic to any medicine  Keep a list of the medicines, vitamins, and herbs you take  Include the amounts, and when and why you take them  Bring the list or the pill bottles to follow-up visits  Carry your medicine list with you in case of an emergency  Physical therapy:  Your healthcare provider may suggest physical therapy  Your physical therapist may teach you exercises to improve posture (the way you stand and sit), flexibility, and strength in your lower back  He may also teach you how to remain safely active and avoid further injury  Follow the exercise plan given to you by your physical therapist   Rest:  Follow your healthcare provider's instructions about how much rest you should get  Avoid activity that worsens your pain    Ice or heat packs:  Use ice or heat packs on the sore area of your body to decrease the pain and swelling  Put ice in a plastic bag covered with a towel on your low back  Cover heated items with a towel to avoid burns  Use ice and heat as directed  Follow up with your healthcare provider or spine specialist within 1 to 2 weeks:  Write down your questions so you remember to ask them during your visits  Contact your healthcare provider or spine specialist if:   Your pain makes it hard for you to do your daily activities, such as work or school  Your pain does not go away after treatment  You feel depressed or anxious  You have questions about your condition or care  Return to the emergency department if:   You have a fever  Your pain is worse than before  Your pain prevents you from sleeping  © Copyright [employer] (formerly known as [employer]) 2022 Information is for End User's use only and may not be sold, redistributed or otherwise used for commercial purposes  All illustrations and images included in CareNotes® are the copyrighted property of A [employer] M , Inc  or [employer]   The above information is an  only  It is not intended as medical advice for individual conditions or treatments  Talk to your doctor, nurse or pharmacist before following any medical regimen to see if it is safe and effective for you

## 2023-02-14 ENCOUNTER — OFFICE VISIT (OUTPATIENT)
Dept: PHYSICAL THERAPY | Facility: CLINIC | Age: 73
End: 2023-02-14

## 2023-02-14 DIAGNOSIS — M51.36 DEGENERATIVE DISC DISEASE, LUMBAR: Primary | ICD-10-CM

## 2023-02-14 DIAGNOSIS — R29.898 WEAKNESS OF BOTH HIPS: ICD-10-CM

## 2023-02-14 DIAGNOSIS — M47.816 LUMBAR FACET ARTHROPATHY: ICD-10-CM

## 2023-02-14 DIAGNOSIS — R19.8 ABDOMINAL WEAKNESS: ICD-10-CM

## 2023-02-14 DIAGNOSIS — S39.012D LUMBAR STRAIN, SUBSEQUENT ENCOUNTER: ICD-10-CM

## 2023-02-14 DIAGNOSIS — G89.29 CHRONIC RIGHT SI JOINT PAIN: ICD-10-CM

## 2023-02-14 DIAGNOSIS — S76.012D STRAIN OF GLUTEUS MEDIUS OF LEFT LOWER EXTREMITY, SUBSEQUENT ENCOUNTER: ICD-10-CM

## 2023-02-14 DIAGNOSIS — M53.3 CHRONIC RIGHT SI JOINT PAIN: ICD-10-CM

## 2023-02-14 NOTE — PROGRESS NOTES
Daily Note     Today's date: 2023  Patient name: Sarita Castellano  : 1950  MRN: [id number]  Referring provider: Fadia Guaman  Dx:   Encounter Diagnosis     ICD-10-CM    1  Degenerative disc disease, lumbar  M51 36       2  Lumbar facet arthropathy  M47 816       3  Weakness of both hips  R29 898       4  Strain of gluteus medius of left lower extremity, subsequent encounter  S76 012D       5  Chronic right SI joint pain  M53 3     G89 29       6  Lumbar strain, subsequent encounter  S39 012D       7  Abdominal weakness  R19 8                      Subjective: Pt states she had some soreness last week, but is better now  Notes pain with walking  Objective: See treatment diary below      Assessment: Tolerated treatment well  Patient demonstrated fatigue post treatment and would benefit from continued PT  Pt has discomfort with RLE motion with standing hip 3 way, but able to maintain proper trunk upright posture with LE movements  Program kept gentle this date for introduction and as she is back tomorrow for her next appt  Progress as within tolerance  Plan: Continue per plan of care  Progress treatment as tolerated         Precautions: bladder prolapse, GERD, IBS, hypothyroidism, HTN, hyperlipidemia, asthma      RE: 3/7  EPOC:              Manuals            SIJ MET? MS                                                   Neuro Re-Ed             TrA brace hep 3"x20           TrA + march  5"x10 ea                                                                            Ther Ex             rec bike for CV endurance and ROM  lvl 1 5'           SLR flex  2x10 ea           HL hip add  5"x20           HL hip abd  RTB 5"x20 ea           Lumbar rollout Hep 1 way 3 way 10"x10 ea           Row/LPD  GTB 2x10 ea           Resisted side step             FSU/LSU             Mini squat             Standing hip 3 way  x10 ea BL                                     Pt edu/HEP MS Ther Activity                                       Gait Training                                       Modalities

## 2023-02-15 ENCOUNTER — OFFICE VISIT (OUTPATIENT)
Dept: PHYSICAL THERAPY | Facility: CLINIC | Age: 73
End: 2023-02-15

## 2023-02-15 ENCOUNTER — TELEPHONE (OUTPATIENT)
Dept: PAIN MEDICINE | Facility: CLINIC | Age: 73
End: 2023-02-15

## 2023-02-15 DIAGNOSIS — G89.29 CHRONIC RIGHT SI JOINT PAIN: ICD-10-CM

## 2023-02-15 DIAGNOSIS — R19.8 ABDOMINAL WEAKNESS: ICD-10-CM

## 2023-02-15 DIAGNOSIS — M51.36 DEGENERATIVE DISC DISEASE, LUMBAR: Primary | ICD-10-CM

## 2023-02-15 DIAGNOSIS — M53.3 CHRONIC RIGHT SI JOINT PAIN: ICD-10-CM

## 2023-02-15 DIAGNOSIS — R29.898 WEAKNESS OF BOTH HIPS: ICD-10-CM

## 2023-02-15 DIAGNOSIS — S39.012D LUMBAR STRAIN, SUBSEQUENT ENCOUNTER: ICD-10-CM

## 2023-02-15 DIAGNOSIS — S76.012D STRAIN OF GLUTEUS MEDIUS OF LEFT LOWER EXTREMITY, SUBSEQUENT ENCOUNTER: ICD-10-CM

## 2023-02-15 DIAGNOSIS — M47.816 LUMBAR FACET ARTHROPATHY: ICD-10-CM

## 2023-02-15 NOTE — PROGRESS NOTES
Daily Note     Today's date: 2/15/2023  Patient name: Teena Hay  : 1950  MRN: [id number]  Referring provider: Fouzia James  Dx:   Encounter Diagnosis     ICD-10-CM    1  Degenerative disc disease, lumbar  M51 36       2  Chronic right SI joint pain  M53 3     G89 29       3  Lumbar facet arthropathy  M47 816       4  Lumbar strain, subsequent encounter  S39 012D       5  Strain of gluteus medius of left lower extremity, subsequent encounter  S76 012D       6  Weakness of both hips  R29 898       7  Abdominal weakness  R19 8                      Subjective: Pt states she felt okay after yesterday's session  Feels she is able to walk around a little better today      Objective: See treatment diary below      Assessment: Tolerated treatment well  Patient demonstrated fatigue post treatment and would benefit from continued PT  Addition of mini squats and STS for functional BLE strengthening  Addition of resisted side steps for lateral strengthening  Pt has muscle fatigue throughout, but improved tolerance as compared to yesterday's session  Plan to progress as able  Plan: Continue per plan of care  Progress treatment as tolerated         Precautions: bladder prolapse, GERD, IBS, hypothyroidism, HTN, hyperlipidemia, asthma      RE: 3/7  EPOC: 4/             Manuals 2/7 2/14 2/15          SIJ MET? MS                                                   Neuro Re-Ed             TrA brace hep 3"x20 3"x20          TrA + march  5"x10 ea 5"x20 ea                                                                           Ther Ex             rec bike for CV endurance and ROM  lvl 1 5' lvl 1 5'          SLR flex  2x10 ea 2x10 ea          HL hip add  5"x20 5"x20          HL hip abd  RTB 5"x20 ea RTB 5"x20 ea          Lumbar rollout Hep 1 way 3 way 10"x10 ea 3 way 10"x10 ea          Row/LPD  GTB 2x10 ea GTB 3"x20 ea          Resisted side step   RTB below knees x3          FSU/LSU             Mini squat 2x10          Standing hip 3 way  x10 ea BL x10 ea BL          STS   lowmat 2x10                       Pt edu/HEP MS            Ther Activity                                       Gait Training                                       Modalities

## 2023-02-20 ENCOUNTER — OFFICE VISIT (OUTPATIENT)
Dept: PHYSICAL THERAPY | Facility: CLINIC | Age: 73
End: 2023-02-20

## 2023-02-20 DIAGNOSIS — M53.3 CHRONIC RIGHT SI JOINT PAIN: ICD-10-CM

## 2023-02-20 DIAGNOSIS — G89.29 CHRONIC RIGHT SI JOINT PAIN: ICD-10-CM

## 2023-02-20 DIAGNOSIS — M51.36 DEGENERATIVE DISC DISEASE, LUMBAR: ICD-10-CM

## 2023-02-20 DIAGNOSIS — R19.8 ABDOMINAL WEAKNESS: ICD-10-CM

## 2023-02-20 DIAGNOSIS — S76.012D STRAIN OF GLUTEUS MEDIUS OF LEFT LOWER EXTREMITY, SUBSEQUENT ENCOUNTER: Primary | ICD-10-CM

## 2023-02-20 DIAGNOSIS — M47.816 LUMBAR FACET ARTHROPATHY: ICD-10-CM

## 2023-02-20 DIAGNOSIS — R29.898 WEAKNESS OF BOTH HIPS: ICD-10-CM

## 2023-02-20 NOTE — PROGRESS NOTES
Daily Note     Today's date: 2023  Patient name: Mathew Ladd  : 1950  MRN: [id number]  Referring provider: Wicho Hui  Dx:   Encounter Diagnosis     ICD-10-CM    1  Strain of gluteus medius of left lower extremity, subsequent encounter  S76 012D       2  Degenerative disc disease, lumbar  M51 36       3  Chronic right SI joint pain  M53 3     G89 29       4  Lumbar facet arthropathy  M47 816       5  Weakness of both hips  R29 898       6  Abdominal weakness  R19 8           Start Time: 1500  Stop Time:   Total time in clinic (min): 49 minutes    Subjective: patient reports overall improvement  Reports continued pain with walking without AD  Objective: See treatment diary below      Assessment:  Patient was able to progress in therapy with in tolerance  Cueing on maintaining TA set with movement patterns demonstrating good control and being able to perform SLR, hip abd and adduction with holds  Required small corrective postural and control cues with sit to stand requiring small modification performing from higher surface due to required compensatory fwd trunk lean when performed from low mat  Introduced both fwd and lateral step up activity with cues on sequencing and requiring min UE support  Plan: Continue per plan of care  Progress treatment as tolerated         Precautions: bladder prolapse, GERD, IBS, hypothyroidism, HTN, hyperlipidemia, asthma      RE: 3/7  EPOC: 4/4          Manuals 2/7 2/14 2/15 2/20      SIJ MET? MS                                       Neuro Re-Ed          TrA brace hep 3"x20 3"x20 10"x10      TrA + march  5"x10 ea 5"x20 ea 3"x20      TA SLR    2x10 ea      TA Hip Add    10"x10      TA Hip Abd    GTB 10"x10      Pallof Press    Pink x20 ea                Ther Ex    2      rec bike for CV endurance and ROM  lvl 1 5' lvl 1 5' L1  x6 mins      SLR flex  2x10 ea 2x10 ea       HL hip add  5"x20 5"x20       HL hip abd  RTB 5"x20 ea RTB 5"x20 ea       Lumbar rollout Hep 1 way 3 way 10"x10 ea 3 way 10"x10 ea 3 way 10"x10 ea       Row/LPD  GTB 2x10 ea GTB 3"x20 ea       Resisted side step   RTB below knees x3 RTB x 3 laps       FSU/LSU    FSU 6"x10/lat up and over 6"x10      Mini squat   2x10       Standing hip 3 way  x10 ea BL x10 ea BL RTB x10 ea BL      STS   lowmat 2x10 2x10 low mat +foam                Pt edu/HEP MS         Ther Activity                              Gait Training                              Modalities

## 2023-02-20 NOTE — TELEPHONE ENCOUNTER
Caller: Dipesh Hayes     Doctor: Dr Bethany Sapp for call: Patient called stating has an injection and will call back to schedule procedure once she off antibotics    Call back#: [phone]

## 2023-02-22 ENCOUNTER — APPOINTMENT (OUTPATIENT)
Dept: PHYSICAL THERAPY | Facility: CLINIC | Age: 73
End: 2023-02-22

## 2023-02-23 ENCOUNTER — OFFICE VISIT (OUTPATIENT)
Dept: PHYSICAL THERAPY | Facility: CLINIC | Age: 73
End: 2023-02-23

## 2023-02-23 DIAGNOSIS — S76.012D STRAIN OF GLUTEUS MEDIUS OF LEFT LOWER EXTREMITY, SUBSEQUENT ENCOUNTER: Primary | ICD-10-CM

## 2023-02-23 NOTE — PROGRESS NOTES
Daily Note     Today's date: 2023  Patient name: Ward Hernandez  : 1950  MRN: [id number]  Referring provider: Luis Puckett  Dx:   Encounter Diagnosis     ICD-10-CM    1  Strain of gluteus medius of left lower extremity, subsequent encounter  S76 012D                      Subjective: pt reports she is feeling better  Stated she was sore post last session  Objective: See treatment diary below      Assessment: Tolerated treatment well  Patient would benefit from continued PT  Continued w/ current POC as listed below w/ good tolerance  Plan: Continue per plan of care        Precautions: bladder prolapse, GERD, IBS, hypothyroidism, HTN, hyperlipidemia, asthma      RE: 3/7  EPOC:           Manuals 2/7 2/14 2/15 2/20 2/23     SIJ MET? MS                                       Neuro Re-Ed          TrA brace hep 3"x20 3"x20 10"x10 10"x10     TrA + march  5"x10 ea 5"x20 ea 3"x20 3"x20     TA SLR    2x10 ea 2x10 ea     TA Hip Add    10"x10 10"x10     TA Hip Abd    GTB 10"x10 GTB 10"x10     Pall[employer]    Pink x20 ea Pink x20 ea               Ther Ex          rec bike for CV endurance and ROM  lvl 1 5' lvl 1 5' L1  x6 mins L1  x6 mins     SLR flex  2x10 ea 2x10 ea       HL hip add  5"x20 5"x20       HL hip abd  RTB 5"x20 ea RTB 5"x20 ea       Lumbar rollout Hep 1 way 3 way 10"x10 ea 3 way 10"x10 ea 3 way 10"x10 ea  3 way 10"x10 ea      Row/LPD  GTB 2x10 ea GTB 3"x20 ea       Resisted side step   RTB below knees x3 RTB x 3 laps  RTB x 3 laps      FSU/LSU    FSU 6"x10/lat up and over 6"x10 FSU 6" 10x/lat up and over 6" 10x     Mini squat   2x10       Standing hip 3 way  x10 ea BL x10 ea BL RTB x10 ea BL RTB x10 ea BL     STS   lowmat 2x10 2x10 low mat +foam 2x10 low mat +foam               Pt edu/HEP MS         Ther Activity                              Gait Training                              Modalities

## 2023-02-28 ENCOUNTER — APPOINTMENT (OUTPATIENT)
Dept: PHYSICAL THERAPY | Facility: CLINIC | Age: 73
End: 2023-02-28

## 2023-03-02 ENCOUNTER — APPOINTMENT (OUTPATIENT)
Dept: PHYSICAL THERAPY | Facility: CLINIC | Age: 73
End: 2023-03-02

## 2023-03-07 ENCOUNTER — OFFICE VISIT (OUTPATIENT)
Dept: PHYSICAL THERAPY | Facility: CLINIC | Age: 73
End: 2023-03-07

## 2023-03-07 DIAGNOSIS — R19.8 ABDOMINAL WEAKNESS: ICD-10-CM

## 2023-03-07 DIAGNOSIS — R29.898 WEAKNESS OF BOTH HIPS: ICD-10-CM

## 2023-03-07 DIAGNOSIS — S76.012D STRAIN OF GLUTEUS MEDIUS OF LEFT LOWER EXTREMITY, SUBSEQUENT ENCOUNTER: Primary | ICD-10-CM

## 2023-03-07 DIAGNOSIS — M53.3 CHRONIC RIGHT SI JOINT PAIN: ICD-10-CM

## 2023-03-07 DIAGNOSIS — G89.29 CHRONIC RIGHT SI JOINT PAIN: ICD-10-CM

## 2023-03-07 DIAGNOSIS — M51.36 DEGENERATIVE DISC DISEASE, LUMBAR: ICD-10-CM

## 2023-03-07 NOTE — PROGRESS NOTES
Daily Note     Today's date: 3/7/2023  Patient name: Brandy Rivera  : 1950  MRN: [id number]  Referring provider: Nissa Mirza*  Dx:   Encounter Diagnosis     ICD-10-CM    1  Strain of gluteus medius of left lower extremity, subsequent encounter  S76 012D       2  Weakness of both hips  R29 898       3  Abdominal weakness  R19 8       4  Degenerative disc disease, lumbar  M51 36       5  Chronic right SI joint pain  M53 3     G89 29           Start Time: 1229  Stop Time: 6  Total time in clinic (min): 44 minutes    Subjective: patient reports overall improvement  Reports decreased pain and improved mobility  Reports continued limp      Objective: See treatment diary below      Assessment:  Patient was able to make small progressions in therapy without reported onset of pain  Demonstrates improved control of TA muscularity being able to introduce 1# load with marches and perform SLR with less associated difficulty  Was able to introduce bridging exercises with cues on positioning  Heavy cues including use of dowel iris to address posturing and eccentric control with sit to stand  Was able to perform both fwd/lateral step ups in greater ROM      Plan: Continue per plan of care  Progress treatment as tolerated         Precautions: bladder prolapse, GERD, IBS, hypothyroidism, HTN, hyperlipidemia, asthma      RE: 3/7  EPOC: 4/4          Manuals 2/7 2/14 2/15 2/20 2/23 3/7    SIJ MET? MS                                       Neuro Re-Ed    2/20  3/7    TrA brace hep 3"x20 3"x20 10"x10 10"x10 10"x10    TrA + march  5"x10 ea 5"x20 ea 3"x20 3"x20 1# x 20 ea    TA SLR    2x10 ea 2x10 ea 2x10 ea    TA Hip Add    10"x10 10"x10     TA Hip Abd    GTB 10"x10 GTB 10"x10 GTB 10"x10    Pall[employer]    Pink x20 ea Pink x20 ea GTB x 20 ea              Ther Ex    2/20  3/7    rec bike for CV endurance and ROM  lvl 1 5' lvl 1 5' L1  x6 mins L1  x6 mins L1 x 6 mins    SLR flex  2x10 ea 2x10 ea       HL hip add 5"x20 5"x20       HL hip abd  RTB 5"x20 ea RTB 5"x20 ea       Lumbar rollout Hep 1 way 3 way 10"x10 ea 3 way 10"x10 ea 3 way 10"x10 ea  3 way 10"x10 ea  3 way 10"x10    Row/LPD  GTB 2x10 ea GTB 3"x20 ea       Resisted side step   RTB below knees x3 RTB x 3 laps  RTB x 3 laps  GTB x 3 laps    FSU/LSU    FSU 6"x10/lat up and over 6"x10 FSU 6" 10x/lat up and over 6" 10x FSU 8"x10 ea/lat up and over 8"x10    Mini squat   2x10       Standing hip 3 way  x10 ea BL x10 ea BL RTB x10 ea BL RTB x10 ea BL     STS   lowmat 2x10 2x10 low mat +foam 2x10 low mat +foam Low mat x10 (heavy cues)/ +foam x10 (butt tap)    Bridge      GTB 3" 2x10    Pt edu/HEP MS         Ther Activity                              Gait Training                              Modalities

## 2023-03-09 ENCOUNTER — EVALUATION (OUTPATIENT)
Dept: PHYSICAL THERAPY | Facility: CLINIC | Age: 73
End: 2023-03-09

## 2023-03-09 DIAGNOSIS — S39.012D LUMBAR STRAIN, SUBSEQUENT ENCOUNTER: ICD-10-CM

## 2023-03-09 DIAGNOSIS — G89.29 CHRONIC RIGHT SI JOINT PAIN: Primary | ICD-10-CM

## 2023-03-09 DIAGNOSIS — R29.898 WEAKNESS OF BOTH HIPS: ICD-10-CM

## 2023-03-09 DIAGNOSIS — M53.3 CHRONIC RIGHT SI JOINT PAIN: Primary | ICD-10-CM

## 2023-03-09 DIAGNOSIS — R19.8 ABDOMINAL WEAKNESS: ICD-10-CM

## 2023-03-09 DIAGNOSIS — M51.36 DEGENERATIVE DISC DISEASE, LUMBAR: ICD-10-CM

## 2023-03-09 DIAGNOSIS — S76.012D STRAIN OF GLUTEUS MEDIUS OF LEFT LOWER EXTREMITY, SUBSEQUENT ENCOUNTER: ICD-10-CM

## 2023-03-09 DIAGNOSIS — M47.816 LUMBAR FACET ARTHROPATHY: ICD-10-CM

## 2023-03-09 NOTE — PROGRESS NOTES
PT Re-Evaluation     Today's date: 3/9/2023  Patient name: Hollie Choudhary  : 1950  MRN: [id number]  Referring provider: Triny Acuna  Dx:   Encounter Diagnosis     ICD-10-CM    1  Chronic right SI joint pain  M53 3     G89 29       2  Strain of gluteus medius of left lower extremity, subsequent encounter  S76 012D       3  Lumbar facet arthropathy  M47 816       4  Abdominal weakness  R19 8       5  Lumbar strain, subsequent encounter  S39 012D       6  Degenerative disc disease, lumbar  M51 36       7  Weakness of both hips  R29 898                      Assessment  Assessment details: 3/9 Re-Eval: Pt reports 65%  improvement over her course of care  She reports decreased overall pain intensity and frequency allowing for improve functional/household performance  9 pt improvement in FOTO score also indicative of functional improvements made  She is progressing well in PT sessions with good tolerance to increased weight/resistance  Improved core stability  She would benefit from continued PT to address continued limitations in order to improve stability, household/recreaiotnal tolerance, quality of life  Impairments: abnormal gait, abnormal or restricted ROM, activity intolerance, impaired physical strength, lacks appropriate home exercise program, pain with function, poor posture  and poor body mechanics  Barriers to therapy: chronicity of dx  Understanding of Dx/Px/POC: good  Goals  STG-in 4 weeks  1  Pain at worst 6/10-met  2  Pain-free lumbar AROM-ongoing  3  Pt able to walk 2 blocks before symptom onset-ongoing    LTG-by DC  1  Pain at worst 3/10-ongoing  2  Pt able to grocery shop without increased low back pain-ongoing  3  Pt able to walk dog without increased back pain-ongoing  4  Independent with HEP-met  5   Increase FOTO to >/= expected-ongoing    Plan  Patient would benefit from: skilled physical therapy  Planned modality interventions: biofeedback, cryotherapy, electrical stimulation/Russian stimulation, iontophoresis, unattended electrical stimulation, ultrasound, traction, thermotherapy: paraffin bath, thermotherapy: hydrocollator packs, TENS and low level laser therapy  Planned therapy interventions: aquatic therapy, balance, body mechanics training, coordination, flexibility, functional ROM exercises, gait training, graded exercise, home exercise program, therapeutic exercise, therapeutic activities, stretching, strengthening, postural training, patient education, neuromuscular re-education, manual therapy and joint mobilization  Frequency: 2x week  Duration in weeks: 8  Plan of Care beginning date: 2023  Plan of Care expiration date: 2023  Treatment plan discussed with: patient        Subjective Evaluation    History of Present Illness  Mechanism of injury: 3/9 Re-Eval: Pt reports she is feeling better overall  She gets soreness after PT sessions and with the changing weather  Good compliance with HEP  Still has difficulty with getting up from seated surfaces  Pain  Current pain rating: 3  At best pain ratin  At worst pain ratin  Location: LBP  Quality: dull ache and radiating  Relieving factors: heat and change in position  Aggravating factors: walking and standing    Social Support  Lives in: multiple-level home  Lives with: alone    Treatments  Previous treatment: chiropractic and medication  Current treatment: physical therapy  Patient Goals  Patient goals for therapy: decreased pain, increased motion, increased strength, independence with ADLs/IADLs and return to sport/leisure activities  Patient goal: restore PLOF        Objective     Tenderness     Right Hip   Tenderness in the PSIS       Additional Tenderness Details  TTP R piriformis    Active Range of Motion     Lumbar   Flexion: 90 degrees   Extension: 20 degrees   Left lateral flexion: 15 degrees       Right lateral flexion: 25 degrees   Left Hip   External rotation (90/90): 30 degrees   Internal rotation (90/90): 30 degrees     Right Hip   External rotation (90/90): 30 degrees   Internal rotation (90/90): 30 degrees     Strength/Myotome Testing     Left Hip   Planes of Motion   Flexion: 4+  Extension: 4+  Abduction: 4+  Adduction: 4+    Right Hip   Planes of Motion   Flexion: 4  Extension: 4-  Abduction: 4+  Adduction: 4+    Additional Strength Details  Tested in seated position this date    General Comments:      Lumbar Comments  Decreased core/glute strength and endurance  Decreased postural endurance  Relief with repeated lumbar extension  LLE longer than R, somewhat corrected this date with MET, will re-assess             Precautions: bladder prolapse, GERD, IBS, hypothyroidism, HTN, hyperlipidemia, asthma      RE: 4/4  EPOC: 4/4          Manuals 2/7 2/14 2/15 2/20 2/23 3/7 3/9 RE   SIJ MET? MS                                       Neuro Re-Ed    2/20  3/7    TrA brace hep 3"x20 3"x20 10"x10 10"x10 10"x10 10"x10   TrA + march  5"x10 ea 5"x20 ea 3"x20 3"x20 1# x 20 ea 1# x20 ea   TA SLR    2x10 ea 2x10 ea 2x10 ea 1# 2x10 ea   TA Hip Add    10"x10 10"x10     TA Hip Abd    GTB 10"x10 GTB 10"x10 GTB 10"x10 GTB 10"x10   Pall[employer]    Pink x20 ea Pink x20 ea GTB x 20 ea              Ther Ex    2/20  3/7    rec bike for CV endurance and ROM  lvl 1 5' lvl 1 5' L1  x6 mins L1  x6 mins L1 x 6 mins L1 x6'   SLR flex  2x10 ea 2x10 ea       HL hip add  5"x20 5"x20       HL hip abd  RTB 5"x20 ea RTB 5"x20 ea       Lumbar rollout Hep 1 way 3 way 10"x10 ea 3 way 10"x10 ea 3 way 10"x10 ea  3 way 10"x10 ea  3 way 10"x10 3 way 10"x10   Row/LPD  GTB 2x10 ea GTB 3"x20 ea       Resisted side step   RTB below knees x3 RTB x 3 laps  RTB x 3 laps  GTB x 3 laps    FSU/LSU    FSU 6"x10/lat up and over 6"x10 FSU 6" 10x/lat up and over 6" 10x FSU 8"x10 ea/lat up and over 8"x10 8" x10 ea fwd/lat   Mini squat   2x10       Standing hip 3 way  x10 ea BL x10 ea BL RTB x10 ea BL RTB x10 ea BL     STS   lowmat 2x10 2x10 low mat +foam 2x10 low mat +foam Low mat x10 (heavy cues)/ +foam x10 (butt tap) lowmat +foam 2x10 butt taps   Bridge      GTB 3" 2x10    Pt edu/HEP MS      MS-RE, FOTO, subj/obj   Ther Activity                              Gait Training                              Modalities

## 2023-03-14 ENCOUNTER — APPOINTMENT (OUTPATIENT)
Dept: PHYSICAL THERAPY | Facility: CLINIC | Age: 73
End: 2023-03-14

## 2023-03-15 ENCOUNTER — OFFICE VISIT (OUTPATIENT)
Dept: PHYSICAL THERAPY | Facility: CLINIC | Age: 73
End: 2023-03-15

## 2023-03-15 DIAGNOSIS — R19.8 ABDOMINAL WEAKNESS: ICD-10-CM

## 2023-03-15 DIAGNOSIS — M53.3 CHRONIC RIGHT SI JOINT PAIN: ICD-10-CM

## 2023-03-15 DIAGNOSIS — G89.29 CHRONIC RIGHT SI JOINT PAIN: ICD-10-CM

## 2023-03-15 DIAGNOSIS — S76.012D STRAIN OF GLUTEUS MEDIUS OF LEFT LOWER EXTREMITY, SUBSEQUENT ENCOUNTER: ICD-10-CM

## 2023-03-15 DIAGNOSIS — M51.36 DEGENERATIVE DISC DISEASE, LUMBAR: ICD-10-CM

## 2023-03-15 DIAGNOSIS — R29.898 WEAKNESS OF BOTH HIPS: ICD-10-CM

## 2023-03-15 DIAGNOSIS — S39.012D LUMBAR STRAIN, SUBSEQUENT ENCOUNTER: Primary | ICD-10-CM

## 2023-03-15 DIAGNOSIS — M47.816 LUMBAR FACET ARTHROPATHY: ICD-10-CM

## 2023-03-15 NOTE — PROGRESS NOTES
Daily Note     Today's date: 3/15/2023  Patient name: Eugene De Guzman  : 1950  MRN: [id number]  Referring provider: Warren Parra*  Dx:   Encounter Diagnosis     ICD-10-CM    1  Lumbar strain, subsequent encounter  S39 012D       2  Strain of gluteus medius of left lower extremity, subsequent encounter  S76 012D       3  Chronic right SI joint pain  M53 3     G89 29       4  Degenerative disc disease, lumbar  M51 36       5  Lumbar facet arthropathy  M47 816       6  Weakness of both hips  R29 898       7  Abdominal weakness  R19 8           Start Time: 3700  Stop Time: 4680  Total time in clinic (min): 45 minutes    Subjective: patient reports overall improvement  Reports pain is almost abolished  Reports fatigue with walking longer distances without use of walking stick      Objective: See treatment diary below      Assessment:  Patient demonstrates improved control with stepping and squatting activities  Was able to perform sit to  greater ROM with out associated compensations  Does present with knee valgus with concentric portion  Was able to increase reps with both fwd/lateral step ups being challenged by fatigue  Was able to introduce unilateral farmer's carry with cueing on mechanics and posturing  Plan: Continue per plan of care  Progress treatment as tolerated  Precautions: bladder prolapse, GERD, IBS, hypothyroidism, HTN, hyperlipidemia, asthma      RE:   EPOC:          Manuals 3/15 2/15 2/20 2/23 3/7 3/9 RE   SIJ MET?                                     Neuro Re-Ed 3/15  2/20  3/7    TrA brace  3"x20 10"x10 10"x10 10"x10 10"x10   TrA + march 1# x20  5"x20 ea 3"x20 3"x20 1# x 20 ea 1# x20 ea   TA SLR 1# 3x10   2x10 ea 2x10 ea 2x10 ea 1# 2x10 ea   TA Hip Add   10"x10 10"x10     TA Hip Abd   GTB 10"x10 GTB 10"x10 GTB 10"x10 GTB 10"x10   Pallof Press GTB x 20 ea  Pink x20 ea Pink x20 ea GTB x 20 ea    Uni Lateral Barkley's Carry 5# x 3 laps ea        Ther Ex 3/15 2/20  3/7    rec bike for CV endurance and ROM L2 x 6 mins lvl 1 5' L1  x6 mins L1  x6 mins L1 x 6 mins L1 x6'   SLR flex  2x10 ea       HL hip add  5"x20       HL hip abd  RTB 5"x20 ea       Lumbar rollout  3 way 10"x10 ea 3 way 10"x10 ea  3 way 10"x10 ea  3 way 10"x10 3 way 10"x10   Row/LPD  GTB 3"x20 ea       Resisted side step GTB x 3 laps RTB below knees x3 RTB x 3 laps  RTB x 3 laps  GTB x 3 laps    FSU/LSU Lat up and over/Fwd 8" 2x10 ea BL   FSU 6"x10/lat up and over 6"x10 FSU 6" 10x/lat up and over 6" 10x FSU 8"x10 ea/lat up and over 8"x10 8" x10 ea fwd/lat   Mini squat  2x10       Standing hip 3 way  x10 ea BL RTB x10 ea BL RTB x10 ea BL     STS Low mat 2x10 lowmat 2x10 2x10 low mat +foam 2x10 low mat +foam Low mat x10 (heavy cues)/ +foam x10 (butt tap) lowmat +foam 2x10 butt taps   Bridge GTB 3" x20    GTB 3" 2x10    Pt edu/HEP      MS-RE, FOTO, subj/obj   Ther Activity                           Gait Training                           Modalities

## 2023-03-21 ENCOUNTER — OFFICE VISIT (OUTPATIENT)
Dept: PHYSICAL THERAPY | Facility: CLINIC | Age: 73
End: 2023-03-21

## 2023-03-21 DIAGNOSIS — S39.012D LUMBAR STRAIN, SUBSEQUENT ENCOUNTER: Primary | ICD-10-CM

## 2023-03-21 DIAGNOSIS — M51.36 DEGENERATIVE DISC DISEASE, LUMBAR: ICD-10-CM

## 2023-03-21 DIAGNOSIS — R19.8 ABDOMINAL WEAKNESS: ICD-10-CM

## 2023-03-21 DIAGNOSIS — G89.29 CHRONIC RIGHT SI JOINT PAIN: ICD-10-CM

## 2023-03-21 DIAGNOSIS — S76.012D STRAIN OF GLUTEUS MEDIUS OF LEFT LOWER EXTREMITY, SUBSEQUENT ENCOUNTER: ICD-10-CM

## 2023-03-21 DIAGNOSIS — M47.816 LUMBAR FACET ARTHROPATHY: ICD-10-CM

## 2023-03-21 DIAGNOSIS — M53.3 CHRONIC RIGHT SI JOINT PAIN: ICD-10-CM

## 2023-03-21 DIAGNOSIS — R29.898 WEAKNESS OF BOTH HIPS: ICD-10-CM

## 2023-03-21 NOTE — PROGRESS NOTES
Daily Note     Today's date: 3/21/2023  Patient name: Fozia Broderick  : 1950  MRN: [id number]  Referring provider: Danny Londono*  Dx:   Encounter Diagnosis     ICD-10-CM    1  Lumbar strain, subsequent encounter  S39 012D       2  Lumbar facet arthropathy  M47 816       3  Strain of gluteus medius of left lower extremity, subsequent encounter  S76 012D                      Subjective: Pt with no new reports      Objective: See treatment diary below      Assessment: Tolerated treatment well  Patient demonstrated fatigue post treatment and would benefit from continued PT  Addition of resisted walking for functional core strengthening  General activity endurance notably improved  Decreased cueing required throughout for proper from  Plan: Continue per plan of care  Progress treatment as tolerated  Precautions: bladder prolapse, GERD, IBS, hypothyroidism, HTN, hyperlipidemia, asthma      RE:   EPOC:          Manuals 3/15 3/21   3/7 3/9 RE   SIJ MET?                                     Neuro Re-Ed 3/15    3/7    TrA brace     10"x10 10"x10   TrA + march 1# x20  2# x20 ea   1# x 20 ea 1# x20 ea   TA SLR 1# 3x10  2# 2x10 ea   2x10 ea 1# 2x10 ea   TA Hip Add         TA Hip Abd     GTB 10"x10 GTB 10"x10   Pallof Press GTB x 20 ea GTB x20 ea   GTB x 20 ea    Uni Lateral Barkley's Carry 5# x 3 laps ea 5# x3 ea       Ther Ex 3/15    3/7    rec bike for CV endurance and ROM L2 x 6 mins L2 x6'   L1 x 6 mins L1 x6'   SLR flex         HL hip add         HL hip abd         Lumbar rollout     3 way 10"x10 3 way 10"x10   Row/LPD         Resisted side step GTB x 3 laps GTB x3   GTB x 3 laps    FSU/LSU Lat up and over/Fwd 8" 2x10 ea BL  Lat up and over 8" x20    Fwd 8" x20 ea BL   FSU 8"x10 ea/lat up and over 8"x10 8" x10 ea fwd/lat   Mini squat         Standing hip 3 way         STS Low mat 2x10 lowmat 2x10   Low mat x10 (heavy cues)/ +foam x10 (butt tap) lowmat +foam 2x10 butt taps   Bridge GTB 3" x20 GTB 3"x20   GTB 3" 2x10    resisted walking  10# fwd/bkwd x4 ea       Pt edu/HEP      MS-RE, FOTO, subj/obj   Ther Activity                           Gait Training                           Modalities

## 2023-03-24 ENCOUNTER — APPOINTMENT (OUTPATIENT)
Dept: PHYSICAL THERAPY | Facility: CLINIC | Age: 73
End: 2023-03-24

## 2023-03-30 NOTE — PROGRESS NOTES
Pt called requesting DC to HEP as she is feeling pretty good   FOTO done over the phone with score of 94

## 2023-05-08 ENCOUNTER — TELEPHONE (OUTPATIENT)
Dept: GASTROENTEROLOGY | Facility: CLINIC | Age: 73
End: 2023-05-08

## 2023-06-01 ENCOUNTER — HOSPITAL ENCOUNTER (EMERGENCY)
Facility: HOSPITAL | Age: 73
Discharge: HOME | End: 2023-06-01
Attending: EMERGENCY MEDICINE | Admitting: EMERGENCY MEDICINE
Payer: MEDICARE

## 2023-06-01 VITALS
WEIGHT: 218 LBS | RESPIRATION RATE: 18 BRPM | SYSTOLIC BLOOD PRESSURE: 152 MMHG | OXYGEN SATURATION: 99 % | HEART RATE: 74 BPM | BODY MASS INDEX: 38.62 KG/M2 | HEIGHT: 63 IN | TEMPERATURE: 98.6 F | DIASTOLIC BLOOD PRESSURE: 67 MMHG

## 2023-06-01 DIAGNOSIS — H61.20 CERUMEN IMPACTION: ICD-10-CM

## 2023-06-01 DIAGNOSIS — R68.84 JAW PAIN: ICD-10-CM

## 2023-06-01 DIAGNOSIS — H92.02 LEFT EAR PAIN: Primary | ICD-10-CM

## 2023-06-01 PROCEDURE — 99282 EMERGENCY DEPT VISIT SF MDM: CPT

## 2023-06-01 NOTE — ED PROVIDER NOTES
Pt Name: Jennifer Grove  MRN: [id number]  [place] 1950  Age/Sex: 75 y o  female  Date of evaluation: 6/1/2023  PCP: Brandan Latham MD    [address] [place] [address]    Chief Complaint   Patient presents with   • Earache     Pt reports left sided ear infection and prescribed antibiotics for infection and cerumen impaction last week at urgent care, pt went back to PCP Tuesday and had ear flushed  Pt reports increased pain in ear that now radiates into jaw  HPI    75 y o  female presenting with left-sided ear pain  Patient states this pain has been going on for a week and a half, she was seen at an urgent care, prescribed antibiotics and steroid eardrop  Patient was subsequently seen by her primary care doctor, started on cefdinir by mouth yesterday  Patient also underwent attempted cerumen disimpaction and flushing, notes worsening pain since then  Pain is currently dull, moderate intensity, in the left ear as well as the left TMJ, radiating throughout the jaw, worse with chewing or pressing on the area and better at rest   She denies fever, nausea, vomiting, trauma, other symptoms        HPI      Past Medical and Surgical History    Past Medical History:   Diagnosis Date   • Arthritis    • Asthma    • Cancer ([facility] [address] )     Skin cancer   • Emphysema of lung ([facility] [address] ) 01/2020    Albuterol   • GERD (gastroesophageal reflux disease)    • Hyperlipidemia    • Hypertension    • Hypothyroidism    • IBS (irritable bowel syndrome)    • Osteoarthritis        Past Surgical History:   Procedure Laterality Date   • ADENOIDECTOMY     • BLADDER SURGERY      LIFT   • BREAST SURGERY Left    • CHOLECYSTECTOMY     • TONSILLECTOMY         Family History   Problem Relation Age of Onset   • Breast cancer Mother    • Dementia Mother    • Colon cancer Father    • Heart disease Brother        Social History     Tobacco Use   • Smoking status: Former     Packs/day: 1 00     Years: 15 00     Total pack years: 15 00     Types: Cigarettes   • Smokeless tobacco: Never   Vaping Use   • Vaping Use: Never used   Substance Use Topics   • Alcohol use: Yes     Comment: 1-2 times a year   • Drug use: No           Allergies    Allergies   Allergen Reactions   • Adhesive  [Medical Tape]      blisters   • Amoxicillin-Pot Clavulanate Diarrhea and Other (See Comments)   • Ciprofloxacin    • Morphine Vomiting   • Other    • Phenazopyridine Other (See Comments)       Home Medications    Prior to Admission medications    Medication Sig Start Date End Date Taking?  Authorizing Provider   acetaminophen-codeine (TYLENOL with CODEINE #3) 300-30 MG per tablet acetaminophen 300 mg-codeine 30 mg tablet  Patient not taking: No sig reported    Historical Provider, MD   albuterol (PROVENTIL HFA,VENTOLIN HFA) 90 mcg/act inhaler albuterol sulfate HFA 90 mcg/actuation aerosol inhaler    Historical Provider, MD   alosetron (LOTRONEX) 0 5 MG tablet TAKE 1 TABLET (0 5 MG TOTAL) BY MOUTH 2 (TWO) TIMES A DAY  Patient not taking: Reported on 11/3/2021 2/27/19   Tricia Jorgensen PA-C   ALPRAZolam Inis Ang) 0 5 mg tablet Take 0 5 mg by mouth Three times daily as needed 10/19/21 4/17/22  Historical Provider, MD   amLODIPine (NORVASC) 5 mg tablet Take by mouth    Historical Provider, MD   amoxicillin (AMOXIL) 500 mg capsule amoxicillin 500 mg capsule  Patient not taking: No sig reported    Historical Provider, MD   aspirin 81 MG tablet Take by mouth  Patient not taking: Reported on 11/3/2021    Historical Provider, MD   cholecalciferol (VITAMIN D3) 1,000 units tablet Take 6,000 Units by mouth daily    Historical Provider, MD   citalopram (CeleXA) 40 mg tablet Take 40 mg by mouth daily 2/23/18   Historical Provider, MD   colestipol (COLESTID) 1 g tablet Take 1 tablet (1 g total) by mouth 2 (two) times a day for 14 days 11/3/21 11/17/21  Galen To PA-C   diclofenac (VOLTAREN) 75 mg EC tablet Take 1 tablet (75 mg total) by mouth 2 (two) times a day 1/23/23   [facility] Arnaldo Reddy, DO Diclofenac Sodium (VOLTAREN) 1 % Voltaren 1 % topical gel   APPLY 2 GRAM TO THE AFFECTED AREA(S) BY TOPICAL ROUTE 4 TIMES PER DAY    Historical Provider, MD   Difluprednate 0 05 % EMUL Durezol 0 05 % eye drops  Patient not taking: No sig reported    Historical Provider, MD   doxycycline hyclate (VIBRA-TABS) 100 mg tablet doxycycline hyclate 100 mg tablet  Patient not taking: No sig reported    Historical Provider, MD   ezetimibe (ZETIA) 10 mg tablet Take 10 mg by mouth daily 3/16/18   Historical Provider, MD   HYDROcodone-acetaminophen (NORCO) 5-325 mg per tablet hydrocodone 5 mg-acetaminophen 325 mg tablet  Patient not taking: No sig reported    Historical Provider, MD   hydroquinone 4 % cream TWICE A DAY TO FACE SPOTS 2/26/18   Historical Provider, MD   ibuprofen (MOTRIN) 800 mg tablet ibuprofen 800 mg tablet    Historical Provider, MD   ketoconazole (NIZORAL) 2 % cream ketoconazole 2 % topical cream    Historical Provider, MD   levothyroxine 112 mcg tablet Take 112 mcg by mouth daily 3/16/18   Historical Provider, MD   levothyroxine 125 mcg tablet Take 125 mcg by mouth daily 10/19/21 10/19/22  Historical Provider, MD   Loteprednol Etabonate (Lotemax) 0 5 % OINT Lotemax 0 5 % eye ointment    Historical Provider, MD   meloxicam (MOBIC) 15 mg tablet TAKE 1 TABLET BY MOUTH EVERY DAY WITH MEAL  Patient not taking: Reported on 11/3/2021 4/3/18   Historical Provider, MD   mometasone (NASONEX) 50 mcg/act nasal spray 2 sprays into each nostril daily 3/10/20   Ruben Hyatt MD   nystatin-triamcinolone (MYCOLOG-II) ointment nystatin-triamcinolone 100,000 unit/gram-0 1 % topical ointment    Historical Provider, MD   pantoprazole (PROTONIX) 40 mg tablet TAKE 1 TABLET BY MOUTH EVERY DAY 8/23/19   Tricia Jorgensen PA-C   Probiotic Product ([employer] PROBIOTIC PO) Take by mouth    Historical Provider, MD   RESTASIS MULTIDOSE 0 05 % ophthalmic emulsion APPLY 1 DROP INTO BOTH EYES EVERY TWELVE HOURS 4/5/18   Historical Provider, MD rosuvastatin (CRESTOR) 5 mg tablet Take by mouth    Historical Provider, MD   sulfamethoxazole-trimethoprim (BACTRIM DS) 800-160 mg per tablet sulfamethoxazole 800 mg-trimethoprim 160 mg tablet    Historical Provider, MD   tacrolimus (PROTOPIC) 0 1 % ointment  8/10/21   Historical Provider, MD   tobramycin (TOBREX) 0 3 % SOLN tobramycin 0 3 % eye drops    Historical Provider, MD   triamcinolone (KENALOG) 0 1 % cream triamcinolone acetonide 0 1 % topical cream   ONCE DAILY TO BODY PSORIASIS FOR UP TO 3 WEEKS  DO NOT USE ON FACE OR GENITALS  Historical Provider, MD           Review of Systems    Review of Systems   Constitutional: Negative for activity change, chills and fever  HENT: Positive for ear pain  Negative for drooling and facial swelling  Eyes: Negative for pain, discharge and visual disturbance  Respiratory: Negative for apnea, cough, chest tightness, shortness of breath and wheezing  Cardiovascular: Negative for chest pain and leg swelling  Gastrointestinal: Negative for abdominal pain, constipation, diarrhea, nausea and vomiting  Genitourinary: Negative for difficulty urinating, dysuria and urgency  Musculoskeletal: Negative for arthralgias, back pain and gait problem  Skin: Negative for color change and rash  Neurological: Negative for dizziness, speech difficulty, weakness and headaches  Psychiatric/Behavioral: Negative for agitation, behavioral problems and confusion  All other systems reviewed and negative  Physical Exam      ED Triage Vitals [06/01/23 1430]   Temperature Pulse Respirations Blood Pressure SpO2   98 6 °F (37 °C) 79 17 156/70 98 %      Temp Source Heart Rate Source Patient Position - Orthostatic VS BP Location FiO2 (%)   Oral Monitor Sitting Left arm --      Pain Score       --               Physical Exam  Vitals and nursing note reviewed  Constitutional:       General: She is not in acute distress  Appearance: She is well-developed   She is not ill-appearing, toxic-appearing or diaphoretic  HENT:      Head: Normocephalic and atraumatic  Right Ear: External ear normal       Left Ear: External ear normal       Ears:      Comments: Right ear noted to have cerumen in canal, no inflammation or tenderness, TM appears intact past earwax  Left ear canal noted to have an abrasion to the inferior aspect and about the 6 o'clock position, TM not visualized due to earwax overlying same  Nose: Nose normal  No congestion or rhinorrhea  Mouth/Throat:      Mouth: Mucous membranes are moist       Pharynx: Oropharynx is clear  Eyes:      Conjunctiva/sclera: Conjunctivae normal       Pupils: Pupils are equal, round, and reactive to light  Cardiovascular:      Rate and Rhythm: Normal rate and regular rhythm  Heart sounds: Normal heart sounds  Pulmonary:      Effort: Pulmonary effort is normal  No respiratory distress  Breath sounds: Normal breath sounds  No wheezing or rales  Abdominal:      General: There is no distension  Palpations: Abdomen is soft  Tenderness: There is no abdominal tenderness  There is no guarding or rebound  Musculoskeletal:         General: No deformity  Normal range of motion  Cervical back: Normal range of motion and neck supple  Skin:     General: Skin is warm and dry  Findings: No erythema or rash  Neurological:      Mental Status: She is alert and oriented to person, place, and time  Psychiatric:         Behavior: Behavior normal          Thought Content:  Thought content normal          Judgment: Judgment normal               Diagnostic Results      Labs:    Results Reviewed     None          All labs reviewed and utilized in the medical decision making process    Radiology:    No orders to display       All radiology studies independently viewed by me and interpreted by the radiologist     Procedure    Procedures        ED Course of Care and Re-Assessments      Further flushing with water and peroxide attempted without significant output or relief of symptoms  Patient noted to have an otolaryngology appointment on Monday for this issue, encouraged to follow-up with same  Medications - No data to display        FINAL IMPRESSION    Final diagnoses:   Left ear pain   Cerumen impaction   Jaw pain         DISPOSITION/PLAN    Presentation as above with left ear pain and left jaw pain in the setting of cerumen impaction  Vital signs reassuring, examination as above  Unclear if symptoms are due to cerumen impaction, otitis externa, or if pain is actually due to TMJ dysfunction on the left side which based on examination seems slightly more likely  Low suspicion for mastoiditis, sepsis, meningitis, encephalitis, bacterial pneumonia, other acute life threat  Treated symptomatically, discharged with strict return precautions, follow-up with otolaryngology as previously scheduled  Time reflects when diagnosis was documented in both MDM as applicable and the Disposition within this note     Time User Action Codes Description Comment    6/1/2023  5:12 PM Nancy QUINTANILLA Add [H92 02] Left ear pain     6/1/2023  5:12 PM Nancy QUINTANILLA Add [H61 20] Cerumen impaction     6/1/2023  5:12 PM Ximena Sage Add [R68 84] Jaw pain       ED Disposition     ED Disposition   Discharge    Condition   Stable    Date/Time   Thu Jun 1, 2023  5:12 PM    Comment   Luc Murry discharge to home/self care                 Follow-up Information     Follow up With Specialties Details Why Contact Info Additional [facility] Emergency Department Emergency Medicine Go to  If symptoms worsen [address] Emergency Department, [address] [facility], [place], [address]    Collin Del Rio MD Family Medicine Call  As needed [address]  [address] [facility] [address]       Triston Parnell MD Otolaryngology Go on 6/5/2023 As previously scheduled [address] [facility] [address]               PATIENT REFERRED TO:    [address] [facility] Emergency Department  [address] [facility] [address] [phone]  Go to   If symptoms worsen    Eleanor Bradford MD  [address]  [address]  [phone]    Call   As needed    Triston Parnell MD  [address] [facility] [address]    Go on 6/5/2023  As previously scheduled      DISCHARGE MEDICATIONS:    Discharge Medication List as of 6/1/2023  5:14 PM      CONTINUE these medications which have NOT CHANGED    Details   acetaminophen-codeine (TYLENOL with CODEINE #3) 300-30 MG per tablet acetaminophen 300 mg-codeine 30 mg tablet, Historical Med      albuterol (PROVENTIL HFA,VENTOLIN HFA) 90 mcg/act inhaler albuterol sulfate HFA 90 mcg/actuation aerosol inhaler, Historical Med      alosetron (LOTRONEX) 0 5 MG tablet TAKE 1 TABLET (0 5 MG TOTAL) BY MOUTH 2 (TWO) TIMES A DAY, Starting Wed 2/27/2019, Normal      ALPRAZolam (XANAX) 0 5 mg tablet Take 0 5 mg by mouth Three times daily as needed, Starting Tue 10/19/2021, Until Sun 4/17/2022 at 2359, Historical Med      amLODIPine (NORVASC) 5 mg tablet Take by mouth, Historical Med      amoxicillin (AMOXIL) 500 mg capsule amoxicillin 500 mg capsule, Historical Med      aspirin 81 MG tablet Take by mouth, Historical Med      cholecalciferol (VITAMIN D3) 1,000 units tablet Take 6,000 Units by mouth daily, Historical Med      citalopram (CeleXA) 40 mg tablet Take 40 mg by mouth daily, Starting Fri 2/23/2018, Historical Med      colestipol (COLESTID) 1 g tablet Take 1 tablet (1 g total) by mouth 2 (two) times a day for 14 days, Starting Wed 11/3/2021, Until Wed 11/17/2021, Normal      diclofenac (VOLTAREN) 75 mg EC tablet Take 1 tablet (75 mg total) by mouth 2 (two) times a day, Starting Mon 1/23/2023, Normal      Diclofenac Sodium (VOLTAREN) 1 % Voltaren 1 % topical gel   APPLY 2 GRAM TO THE AFFECTED AREA(S) BY TOPICAL ROUTE 4 TIMES PER DAY, Historical Med      Difluprednate 0 05 % EMUL Durezol 0 05 % eye drops, Historical Med      doxycycline hyclate (VIBRA-TABS) 100 mg tablet doxycycline hyclate 100 mg tablet, Historical Med      ezetimibe (ZETIA) 10 mg tablet Take 10 mg by mouth daily, Starting Fri 3/16/2018, Historical Med      HYDROcodone-acetaminophen (NORCO) 5-325 mg per tablet hydrocodone 5 mg-acetaminophen 325 mg tablet, Historical Med      hydroquinone 4 % cream TWICE A DAY TO FACE SPOTS, Historical Med      ibuprofen (MOTRIN) 800 mg tablet ibuprofen 800 mg tablet, Historical Med      ketoconazole (NIZORAL) 2 % cream ketoconazole 2 % topical cream, Historical Med      levothyroxine 112 mcg tablet Take 112 mcg by mouth daily, Starting Fri 3/16/2018, Historical Med      Loteprednol Etabonate (Lotemax) 0 5 % OINT Lotemax 0 5 % eye ointment, Historical Med      meloxicam (MOBIC) 15 mg tablet TAKE 1 TABLET BY MOUTH EVERY DAY WITH MEAL, Historical Med      mometasone (NASONEX) 50 mcg/act nasal spray 2 sprays into each nostril daily, Starting Tue 3/10/2020, Normal      nystatin-triamcinolone (MYCOLOG-II) ointment nystatin-triamcinolone 100,000 unit/gram-0 1 % topical ointment, Historical Med      pantoprazole (PROTONIX) 40 mg tablet TAKE 1 TABLET BY MOUTH EVERY DAY, Normal      Probiotic Product ([employer] PROBIOTIC PO) Take by mouth, Historical Med      RESTASIS MULTIDOSE 0 05 % ophthalmic emulsion APPLY 1 DROP INTO BOTH EYES EVERY TWELVE HOURS, Historical Med      rosuvastatin (CRESTOR) 5 mg tablet Take by mouth, Historical Med      sulfamethoxazole-trimethoprim (BACTRIM DS) 800-160 mg per tablet sulfamethoxazole 800 mg-trimethoprim 160 mg tablet, Historical Med      tacrolimus (PROTOPIC) 0 1 % ointment Starting Tue 8/10/2021, Historical Med      tobramycin (TOBREX) 0 3 % SOLN tobramycin "0 3 % eye drops, Historical Med      triamcinolone (KENALOG) 0 1 % cream triamcinolone acetonide 0 1 % topical cream   ONCE DAILY TO BODY PSORIASIS FOR UP TO 3 WEEKS  DO NOT USE ON FACE OR GENITALS , Historical Med             No discharge procedures on file  Kenji Umanzor MD    Portions of the record may have been created with voice recognition software  Occasional wrong word or \"sound alike\" substitutions may have occurred due to the inherent limitations of voice recognition software    Please read the chart carefully and recognize, using context, where substitutions have occurred     Kenji Umanzor MD  06/02/23 0034    "

## 2024-10-02 ENCOUNTER — TELEPHONE (OUTPATIENT)
Age: 74
End: 2024-10-02

## 2024-10-02 NOTE — TELEPHONE ENCOUNTER
Pt called to see if cxr and stress results were scanned in. She had them done at [facility] and asked them to send them over, as she is scheduled to see Dr. Horn Friday. Did not see reports.  Provided pt fax #.  She will call them to have them resent.

## 2024-10-04 ENCOUNTER — OFFICE VISIT (OUTPATIENT)
Dept: CARDIOLOGY CLINIC | Facility: CLINIC | Age: 74
End: 2024-10-04
Payer: MEDICARE

## 2024-10-04 VITALS
SYSTOLIC BLOOD PRESSURE: 140 MMHG | WEIGHT: 217 LBS | HEART RATE: 62 BPM | BODY MASS INDEX: 37.05 KG/M2 | DIASTOLIC BLOOD PRESSURE: 78 MMHG | OXYGEN SATURATION: 98 % | HEIGHT: 64 IN | RESPIRATION RATE: 16 BRPM

## 2024-10-04 DIAGNOSIS — E66.9 OBESITY (BMI 30-39.9): ICD-10-CM

## 2024-10-04 DIAGNOSIS — I10 PRIMARY HYPERTENSION: ICD-10-CM

## 2024-10-04 DIAGNOSIS — R06.02 SHORTNESS OF BREATH ON EXERTION: Primary | ICD-10-CM

## 2024-10-04 DIAGNOSIS — E78.2 MIXED HYPERLIPIDEMIA: ICD-10-CM

## 2024-10-04 PROCEDURE — 93000 ELECTROCARDIOGRAM COMPLETE: CPT | Performed by: INTERNAL MEDICINE

## 2024-10-04 PROCEDURE — 99204 OFFICE O/P NEW MOD 45 MIN: CPT | Performed by: INTERNAL MEDICINE

## 2024-10-04 NOTE — PROGRESS NOTES
CARDIOLOGY INITIAL VISIT  [facility]  [address] [id number]  Tel: [phone]      NAME: Shelbie Doty  AGE: 74 y.o.  SEX: female  : 1950  MRN: [id number]      Chief Complaint:  Chief Complaint   Patient presents with    New Patient Visit         History of Present Illness:   74-year-old obese female with hypertension, hyperlipidemia who is here for a complaint of shortness of breath which has been ongoing for the last many months.  She has started feeling short of breath climbing steps or walking up an incline.  Denies associated chest pain/pressure, nausea, diaphoresis.  Patient also denies lightheadedness, syncope, swelling feet, orthopnea, PND, claudication.    She recently had a pharmacological nuclear stress test which was normal.    She is scheduled due for getting a lung function test.    Primary hypertension -  Has been hypertensive for many years.  Taking medications regularly.  Denies lightheadedness, headache, medication side effects.      Mixed hyperlipidemia -  Has had hyperlipidemia for many years.  Taking statin regularly along with diet control.  Denies myalgia.  PCP closely monitoring the blood work.    Obesity -  Trying to lose weight.      Past Medical History:  Past Medical History:   Diagnosis Date    Arthritis     Asthma     Cancer (HCC)     Skin cancer    Emphysema of lung (HCC) 2020    Albuterol    GERD (gastroesophageal reflux disease)     Hyperlipidemia     Hypertension     Hypothyroidism     IBS (irritable bowel syndrome)     Osteoarthritis          Past Surgical History:  Past Surgical History:   Procedure Laterality Date    ADENOIDECTOMY      BLADDER SURGERY      LIFT    BREAST SURGERY Left     CHOLECYSTECTOMY      TONSILLECTOMY           Family History:  Family History   Problem Relation Age of Onset    Breast cancer Mother     Dementia Mother     Colon cancer Father     Heart  disease Brother          Social History:  Social History     Socioeconomic History    Marital status:      Spouse name: None    Number of children: None    Years of education: None    Highest education level: None   Occupational History    None   Tobacco Use    Smoking status: Former     Current packs/day: 1.00     Average packs/day: 1 pack/day for 15.0 years (15.0 ttl pk-yrs)     Types: Cigarettes    Smokeless tobacco: Never   Vaping Use    Vaping status: Never Used   Substance and Sexual Activity    Alcohol use: Yes     Comment: 1-2 times a year    Drug use: Never    Sexual activity: Not Currently     Partners: Male   Other Topics Concern    None   Social History Narrative    None     Social Determinants of Health     Financial Resource Strain: Low Risk  (1/17/2024)    Received from [facility]    Overall Financial Resource Strain (CARDIA)     Difficulty of Paying Living Expenses: Not very hard   Food Insecurity: No Food Insecurity (1/17/2024)    Received from [facility]    Hunger Vital Sign     Worried About Running Out of Food in the Last Year: Never true     Ran Out of Food in the Last Year: Never true   Transportation Needs: No Transportation Needs (1/17/2024)    Received from [facility]    PRAPARE - Transportation     Lack of Transportation (Medical): No     Lack of Transportation (Non-Medical): No   Physical Activity: Not on file   Stress: No Stress Concern Present (1/17/2024)    Received from [facility]    [employer] - Occupational Stress Questionnaire     Feeling of Stress : Only a little   Social Connections: Feeling Somewhat Isolated (1/17/2024)    Received from [facility]    OASIS : Social Isolation     How often do you feel lonely or isolated from those around you?: Sometimes   Intimate Partner Violence: Not At Risk (1/17/2024)    Received from [facility]     Humiliation, Afraid, Rape, and Kick questionnaire     Fear of Current or Ex-Partner: No     Emotionally Abused: No     Physically Abused: No     Sexually Abused: No   Housing Stability: Not on file         Active Problems:  There is no problem list on file for this patient.        The following portions of the patient's history were reviewed and updated as appropriate: past medical history, past surgical history, past family history,  past social history, current medications, allergies and problem list.      Review of Systems:  Constitutional: Denies fever, chills  Eyes: Denies eye redness, eye discharge  ENT: Denies sneezing, nasal discharge, sore throat   Respiratory: Denies cough, expectoration. +shortness of breath  Cardiovascular: Denies chest pain, palpitations, lower extremity swelling  Gastrointestinal: Denies abdominal pain, nausea, vomiting, diarrhea  Genito-Urinary: Denies dysuria, incontinence  Musculoskeletal: Denies muscle pain  Neurologic: Denies lightheadedness, syncope, headache, seizures  Endocrine: Denies polydipsia, temperature intolerance  Allergy and Immunology: Denies hives, insect bite sensitivity  Hematological and Lymphatic: Denies bleeding problems, swollen glands   Psychological: Denies depression, suicidal ideation, anxiety, panic  Dermatological: Denies pruritus, rash, skin lesion changes      Vitals:  Vitals:    10/04/24 1314   BP: 140/78   Pulse: 62   Resp: 16   SpO2: 98%       Body mass index is 37.25 kg/m².    Weight (last 2 days)       Date/Time Weight    10/04/24 1314 98.4 (217)              Physical Examination:  General: Patient is not in acute distress.  Obese.  Awake, alert, oriented in time, place and person. Responding to commands  Head: Normocephalic. Atraumatic  Eyes: Both pupils normal sized, round and reactive to light. Nonicteric  ENT: Normal external ear canals  Neck: Supple. JVP not raised. Trachea central. No thyromegaly  Lungs: Bilateral bronchovascular breath  "sounds with no crackles or rhonchi  Chest wall: No tenderness  Cardiovascular: RRR. S1 and S2 normal. No murmur, rub or gallop  Gastrointestinal: Abdomen soft, nontender. No guarding or rigidity. Liver and spleen not palpable. Bowel sounds present  Neurologic: Patient is awake, alert, oriented in time, place and person. Responding to commands. Moving all extremities  Integumentary:  No skin rash  Lymphatic: No cervical lymphadenopathy  Back: Symmetric. No CVA tenderness  Extremities: No clubbing, cyanosis or edema      Laboratory Results:  CBC with diff:   No results found for: \"WBC\", \"RBC\", \"HGB\", \"HCT\", \"MCV\", \"MCH\", \"RDW\", \"PLT\"    CMP:  Lab Results   Component Value Date    CREATININE 0.70 07/18/2024    BUN 14 07/18/2024    K 4.8 07/18/2024     07/18/2024    CO2 27 07/18/2024    ALKPHOS 87 07/18/2024    ALT 16 07/18/2024    AST 18 07/18/2024       Lab Results   Component Value Date    HGBA1C 5.9 (H) 07/18/2024         EKG: Reviewed by me.  10/4/2024.  Normal sinus rhythm.  Poor R wave progression in precordial leads.      Medications:    Current Outpatient Medications:     albuterol (PROVENTIL HFA,VENTOLIN HFA) 90 mcg/act inhaler, albuterol sulfate HFA 90 mcg/actuation aerosol inhaler, Disp: , Rfl:     amLODIPine (NORVASC) 5 mg tablet, Take by mouth, Disp: , Rfl:     cholecalciferol (VITAMIN D3) 1,000 units tablet, Take 6,000 Units by mouth daily, Disp: , Rfl:     citalopram (CeleXA) 40 mg tablet, Take 40 mg by mouth daily, Disp: , Rfl: 3    ezetimibe (ZETIA) 10 mg tablet, Take 10 mg by mouth daily, Disp: , Rfl: 3    levothyroxine 112 mcg tablet, Take 112 mcg by mouth daily, Disp: , Rfl: 3    pantoprazole (PROTONIX) 40 mg tablet, TAKE 1 TABLET BY MOUTH EVERY DAY, Disp: 30 tablet, Rfl: 2    Probiotic Product ([employer] PROBIOTIC PO), Take by mouth, Disp: , Rfl:     RESTASIS MULTIDOSE 0.05 % ophthalmic emulsion, APPLY 1 DROP INTO BOTH EYES EVERY TWELVE HOURS, Disp: , Rfl: 11    rosuvastatin (CRESTOR) 5 mg tablet, " Take by mouth, Disp: , Rfl:     triamcinolone (KENALOG) 0.1 % cream, triamcinolone acetonide 0.1 % topical cream  ONCE DAILY TO BODY PSORIASIS FOR UP TO 3 WEEKS. DO NOT USE ON FACE OR GENITALS., Disp: , Rfl:       Allergies:  Allergies   Allergen Reactions    Adhesive  [Medical Tape]      blisters    Ciprofloxacin     Morphine Vomiting    Other     Phenazopyridine Other (See Comments)         Assessment and Plan:  Assessment & Plan  Shortness of breath on exertion  Recent pharmacological nuclear stress test reviewed.  It was normal  EKG done in the clinic today reviewed with the patient  2D echocardiogram ordered for evaluation of EF, RWMA, PASP  I am glad that you are getting a lung function test done  Primary hypertension  Patient states her blood pressure is always normal at home.  Continue amlodipine 5 mg daily and continue to monitor vitals at home  Mixed hyperlipidemia  Continue Crestor 5 mg daily and Zetia 10 mg daily.  States Zetia was ordered after she could not tolerate a higher dose of Crestor  Continue diet control as well  Obesity (BMI 30-39.9)  Counseled to try to lose weight       Recommend aggressive risk factor modification and therapeutic lifestyle changes.  Low-salt, low-calorie, low-fat, low-cholesterol diet with regular exercise and to optimize weight.  I will defer the ordering and monitoring of necessity lab studies to you, but I am available and happy to review and manage any of the data at your request in the future.    Discussed concepts of atherosclerosis, including signs and symptoms of cardiac disease.    Previous studies were reviewed.    Safety measures were reviewed.  Questions were entertained and answered.  Patient was advised to report any problems requiring medical attention.    Follow-up with PCP and appropriate specialist and lab work as discussed.    Return for follow up visit as scheduled or earlier, if needed.  Thank you for allowing me to participate in the care and  evaluation of your patient.  Should you have any questions, please feel free to contact me.    Jefferson Horn MD  10/4/2024,2:01 PM

## 2024-10-11 ENCOUNTER — HOSPITAL ENCOUNTER (OUTPATIENT)
Dept: NON INVASIVE DIAGNOSTICS | Facility: CLINIC | Age: 74
Discharge: HOME | End: 2024-10-11
Payer: MEDICARE

## 2024-10-11 VITALS
BODY MASS INDEX: 37.05 KG/M2 | SYSTOLIC BLOOD PRESSURE: 140 MMHG | WEIGHT: 217 LBS | HEART RATE: 64 BPM | HEIGHT: 64 IN | DIASTOLIC BLOOD PRESSURE: 78 MMHG

## 2024-10-11 DIAGNOSIS — R06.02 SHORTNESS OF BREATH ON EXERTION: ICD-10-CM

## 2024-10-11 LAB
AORTIC ROOT: 2.7 CM
AORTIC VALVE MEAN VELOCITY: 12.1 M/S
APICAL FOUR CHAMBER EJECTION FRACTION: 73 %
AV AREA BY CONTINUOUS VTI: 1.9 CM2
AV AREA PEAK VELOCITY: 1.8 CM2
AV LVOT MEAN GRADIENT: 3 MMHG
AV LVOT PEAK GRADIENT: 6 MMHG
AV MEAN GRADIENT: 7 MMHG
AV PEAK GRADIENT: 13 MMHG
AV VALVE AREA: 1.9 CM2
AV VELOCITY RATIO: 0.7
BSA FOR ECHO PROCEDURE: 2.03 M2
DOP CALC AO PEAK VEL: 1.77 M/S
DOP CALC AO VTI: 40.82 CM
DOP CALC LVOT AREA: 2.54 CM2
DOP CALC LVOT CARDIAC INDEX: 2.12 L/MIN/M2
DOP CALC LVOT CARDIAC OUTPUT: 4.31 L/MIN
DOP CALC LVOT DIAMETER: 1.8 CM
DOP CALC LVOT PEAK VEL VTI: 30.43 CM
DOP CALC LVOT PEAK VEL: 1.24 M/S
DOP CALC LVOT STROKE INDEX: 36.9 ML/M2
DOP CALC LVOT STROKE VOLUME: 77.4
E WAVE DECELERATION TIME: 309 MS
E/A RATIO: 1.05
FRACTIONAL SHORTENING: 42 (ref 28–44)
INTERVENTRICULAR SEPTUM IN DIASTOLE (PARASTERNAL SHORT AXIS VIEW): 1.1 CM
INTERVENTRICULAR SEPTUM: 1.1 CM (ref 0.6–1.1)
LAAS-AP2: 20.7 CM2
LAAS-AP4: 23.3 CM2
LEFT ATRIUM AREA SYSTOLE SINGLE PLANE A4C: 21.7 CM2
LEFT ATRIUM SIZE: 4.1 CM
LEFT ATRIUM VOLUME (MOD BIPLANE): 72 ML
LEFT ATRIUM VOLUME INDEX (MOD BIPLANE): 35.5 ML/M2
LEFT INTERNAL DIMENSION IN SYSTOLE: 2.6 CM (ref 2.1–4)
LEFT VENTRICULAR INTERNAL DIMENSION IN DIASTOLE: 4.5 CM (ref 3.5–6)
LEFT VENTRICULAR POSTERIOR WALL IN END DIASTOLE: 0.8 CM
LEFT VENTRICULAR STROKE VOLUME: 70 ML
LVSV (TEICH): 70 ML
MV E'TISSUE VEL-LAT: 14 CM/S
MV E'TISSUE VEL-SEP: 8 CM/S
MV PEAK A VEL: 0.8 M/S
MV PEAK E VEL: 84 CM/S
MV STENOSIS PRESSURE HALF TIME: 90 MS
MV VALVE AREA P 1/2 METHOD: 2.44
RIGHT ATRIUM AREA SYSTOLE A4C: 15.1 CM2
RIGHT VENTRICLE ID DIMENSION: 3.4 CM
SL CV LEFT ATRIUM LENGTH A2C: 5.9 CM
SL CV LV EF: 74
SL CV PED ECHO LEFT VENTRICLE DIASTOLIC VOLUME (MOD BIPLANE) 2D: 94 ML
SL CV PED ECHO LEFT VENTRICLE SYSTOLIC VOLUME (MOD BIPLANE) 2D: 24 ML
TR MAX PG: 19 MMHG
TR PEAK VELOCITY: 2.2 M/S
TRICUSPID ANNULAR PLANE SYSTOLIC EXCURSION: 2.7 CM
TRICUSPID VALVE PEAK REGURGITATION VELOCITY: 2.17 M/S

## 2024-10-11 PROCEDURE — 93306 TTE W/DOPPLER COMPLETE: CPT | Performed by: INTERNAL MEDICINE

## 2024-10-11 PROCEDURE — 93306 TTE W/DOPPLER COMPLETE: CPT

## 2024-10-14 ENCOUNTER — TELEPHONE (OUTPATIENT)
Dept: CARDIOLOGY CLINIC | Facility: CLINIC | Age: 74
End: 2024-10-14

## 2024-10-14 NOTE — TELEPHONE ENCOUNTER
----- Message from Jefferson Horn MD sent at 10/11/2024  4:37 PM EDT -----  Please call and inform the patient that the echo was overall normal.